# Patient Record
Sex: MALE | Race: WHITE | Employment: PART TIME | URBAN - METROPOLITAN AREA
[De-identification: names, ages, dates, MRNs, and addresses within clinical notes are randomized per-mention and may not be internally consistent; named-entity substitution may affect disease eponyms.]

---
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
## 2021-03-24 ENCOUNTER — IMMUNIZATION (OUTPATIENT)
Dept: PRIMARY CARE CLINIC | Age: 21
End: 2021-03-24
Payer: COMMERCIAL

## 2021-03-24 PROCEDURE — 0011A COVID-19, MODERNA VACCINE 100MCG/0.5ML DOSE: CPT | Performed by: NURSE PRACTITIONER

## 2021-03-24 PROCEDURE — 91301 COVID-19, MODERNA VACCINE 100MCG/0.5ML DOSE: CPT | Performed by: NURSE PRACTITIONER

## 2021-04-21 ENCOUNTER — IMMUNIZATION (OUTPATIENT)
Dept: PRIMARY CARE CLINIC | Age: 21
End: 2021-04-21
Payer: COMMERCIAL

## 2021-04-21 PROCEDURE — 0012A COVID-19, MODERNA VACCINE 100MCG/0.5ML DOSE: CPT | Performed by: NURSE PRACTITIONER

## 2021-04-21 PROCEDURE — 91301 COVID-19, MODERNA VACCINE 100MCG/0.5ML DOSE: CPT | Performed by: NURSE PRACTITIONER

## 2021-07-07 ENCOUNTER — APPOINTMENT (OUTPATIENT)
Dept: GENERAL RADIOLOGY | Age: 21
End: 2021-07-07
Payer: COMMERCIAL

## 2021-07-07 ENCOUNTER — HOSPITAL ENCOUNTER (EMERGENCY)
Age: 21
Discharge: HOME | End: 2021-07-07
Payer: COMMERCIAL

## 2021-07-07 VITALS
HEART RATE: 84 BPM | HEIGHT: 76 IN | SYSTOLIC BLOOD PRESSURE: 122 MMHG | TEMPERATURE: 98.1 F | DIASTOLIC BLOOD PRESSURE: 71 MMHG | WEIGHT: 230 LBS | BODY MASS INDEX: 28.01 KG/M2 | OXYGEN SATURATION: 99 % | RESPIRATION RATE: 16 BRPM

## 2021-07-07 DIAGNOSIS — S43.402A SPRAIN OF LEFT SHOULDER, UNSPECIFIED SHOULDER SPRAIN TYPE, INITIAL ENCOUNTER: Primary | ICD-10-CM

## 2021-07-07 PROCEDURE — 99211 OFF/OP EST MAY X REQ PHY/QHP: CPT

## 2021-07-07 PROCEDURE — 73030 X-RAY EXAM OF SHOULDER: CPT

## 2021-07-07 ASSESSMENT — PAIN DESCRIPTION - LOCATION: LOCATION: SHOULDER

## 2021-07-07 ASSESSMENT — PAIN SCALES - GENERAL: PAINLEVEL_OUTOF10: 4

## 2021-07-07 ASSESSMENT — PAIN DESCRIPTION - PAIN TYPE: TYPE: ACUTE PAIN

## 2021-07-07 ASSESSMENT — PAIN DESCRIPTION - ORIENTATION: ORIENTATION: LEFT

## 2021-07-07 NOTE — ED PROVIDER NOTES
[id number]  Department of Emergency Medicine  UC Encounter Note  21   12:50 PM EDT      NAME: Stuart Almaraz  :  2000  MRN:  [id number]    Chief Complaint: Shoulder Injury (Pt c/o L shoulder popping out of socket last night, also 3 weeks ago, states it goes back in )      This is a 26-year-old male that presents to urgent care complaining of some left shoulder discomfort for the past 3 weeks. He states he was golfing about 3 weeks ago and he felt some pain in the left shoulder. Recently he was been lifting some weights and felt some more pain in the left anterior shoulder. Denies numbness or tingling. No head or neck or other extremity pain. On first contact patient he appears to be in no acute distress. Review of Systems  Pertinent positives and negatives are stated within HPI, all other systems reviewed and are negative. Physical Exam  Vitals and nursing note reviewed. Constitutional:       Appearance: He is well-developed. HENT:      Head: Normocephalic and atraumatic. Jaw: No trismus. Right Ear: Hearing, tympanic membrane, ear canal and external ear normal.      Left Ear: Hearing, tympanic membrane, ear canal and external ear normal.      Nose: Nose normal.      Right Sinus: No maxillary sinus tenderness or frontal sinus tenderness. Left Sinus: No maxillary sinus tenderness or frontal sinus tenderness. Mouth/Throat:      Pharynx: Uvula midline. No uvula swelling. Eyes:      General: Lids are normal.      Conjunctiva/sclera: Conjunctivae normal.      Pupils: Pupils are equal, round, and reactive to light. Cardiovascular:      Rate and Rhythm: Normal rate and regular rhythm. Heart sounds: Normal heart sounds. No murmur heard. Pulmonary:      Effort: Pulmonary effort is normal.      Breath sounds: Normal breath sounds. Abdominal:      General: Bowel sounds are normal.      Palpations: Abdomen is soft.  Abdomen is not rigid. Tenderness: There is no abdominal tenderness. There is no guarding or rebound. Musculoskeletal:      Cervical back: Normal range of motion and neck supple. Comments: Head and neck are atraumatic. Does have some left anterior shoulder soft tissue tenderness. But no deformity. No open area. Muscle strength bilateral is 5-5 reflexes are brisk. Range of motion left shoulder is full mildly painful. No open area no redness no cyanosis. Has palpable radial pulse. Skin:     General: Skin is warm and dry. Findings: No abrasion or rash. Neurological:      Mental Status: He is alert and oriented to person, place, and time. GCS: GCS eye subscore is 4. GCS verbal subscore is 5. GCS motor subscore is 6. Cranial Nerves: Cranial nerves are intact. No cranial nerve deficit. Sensory: Sensation is intact. No sensory deficit. Motor: Motor function is intact. Coordination: Coordination is intact. Coordination normal.      Gait: Gait is intact. Gait normal.         Procedures    MDM  Number of Diagnoses or Management Options  Sprain of left shoulder, unspecified shoulder sprain type, initial encounter  Diagnosis management comments: X-ray reviewed. Instructions given. Treat as shoulder strain. Follow-up with primary care provider. --------------------------------------------- PAST HISTORY ---------------------------------------------  Past Medical History:  has no past medical history on file. Past Surgical History:  has no past surgical history on file. Social History:  reports that he has never smoked. He has never used smokeless tobacco. He reports that he does not drink alcohol and does not use drugs. Family History: family history is not on file. The patients home medications have been reviewed. Allergies: Patient has no known allergies.     -------------------------------------------------- RESULTS -------------------------------------------------  No results found for this visit on 07/07/21. XR SHOULDER LEFT (MIN 2 VIEWS)   Final Result   No acute fractures or dislocations in the left shoulder             ------------------------- NURSING NOTES AND VITALS REVIEWED ---------------------------   The nursing notes within the ED encounter and vital signs as below have been reviewed. /71   Pulse 84   Temp 98.1 °F (36.7 °C) (Infrared)   Resp 16   Ht 6' 4\" (1.93 m)   Wt 230 lb (104.3 kg)   SpO2 99%   BMI 28.00 kg/m²   Oxygen Saturation Interpretation: Normal      ------------------------------------------ PROGRESS NOTES ------------------------------------------   I have spoken with the patient and discussed todays results, in addition to providing specific details for the plan of care and counseling regarding the diagnosis and prognosis. Their questions are answered at this time and they are agreeable with the plan.      --------------------------------- ADDITIONAL PROVIDER NOTES ---------------------------------     This patient is stable for discharge. I have shared the specific conditions for return, as well as the importance of follow-up. * NOTE: This report was transcribed using voice recognition software. Every effort was made to ensure accuracy; however, inadvertent computerized transcription errors may be present.    --------------------------------- IMPRESSION AND DISPOSITION ---------------------------------    IMPRESSION  1.  Sprain of left shoulder, unspecified shoulder sprain type, initial encounter        DISPOSITION  Disposition: Discharge to home  Patient condition is good       Danis Browning PA-C  07/07/21 [id number]

## 2021-07-07 NOTE — LETTER
[facility]  [address] [facility] [address]  Phone: [phone]               July 7, 2021    Patient: Benjamín Sanchez   YOB: 2000   Date of Visit: 7/7/2021       To Whom It May Concern:    Benjamín Sanchez was seen and treated in our emergency department on 7/7/2021. He may return to work on July 8, 2021.       Sincerely,       Prince Mckeon PA-C         Signature:__________________________________

## 2021-11-11 ENCOUNTER — APPOINTMENT (OUTPATIENT)
Dept: CT IMAGING | Age: 21
End: 2021-11-11
Payer: COMMERCIAL

## 2021-11-11 ENCOUNTER — HOSPITAL ENCOUNTER (OUTPATIENT)
Age: 21
Setting detail: OBSERVATION
Discharge: HOME | End: 2021-11-12
Attending: STUDENT IN AN ORGANIZED HEALTH CARE EDUCATION/TRAINING PROGRAM | Admitting: INTERNAL MEDICINE
Payer: COMMERCIAL

## 2021-11-11 DIAGNOSIS — B27.90 ACUTE TONSILLITIS DUE TO INFECTIOUS MONONUCLEOSIS: Primary | ICD-10-CM

## 2021-11-11 DIAGNOSIS — J03.80 ACUTE TONSILLITIS DUE TO INFECTIOUS MONONUCLEOSIS: Primary | ICD-10-CM

## 2021-11-11 LAB
ALBUMIN SERPL-MCNC: 4.4 G/DL (ref 3.5–5.2)
ALP BLD-CCNC: 73 U/L (ref 40–129)
ALT SERPL-CCNC: 41 U/L (ref 0–40)
ANION GAP SERPL CALCULATED.3IONS-SCNC: 14 MMOL/L (ref 7–16)
AST SERPL-CCNC: 35 U/L (ref 0–39)
BASOPHILS ABSOLUTE: 0 E9/L (ref 0–0.2)
BASOPHILS RELATIVE PERCENT: 0 % (ref 0–2)
BILIRUB SERPL-MCNC: 0.6 MG/DL (ref 0–1.2)
BUN BLDV-MCNC: 11 MG/DL (ref 6–20)
CALCIUM SERPL-MCNC: 9.4 MG/DL (ref 8.6–10.2)
CHLORIDE BLD-SCNC: 101 MMOL/L (ref 98–107)
CO2: 23 MMOL/L (ref 22–29)
CREAT SERPL-MCNC: 1.3 MG/DL (ref 0.7–1.2)
EOSINOPHILS ABSOLUTE: 0 E9/L (ref 0.05–0.5)
EOSINOPHILS RELATIVE PERCENT: 0 % (ref 0–6)
GFR AFRICAN AMERICAN: >60
GFR NON-AFRICAN AMERICAN: >60 ML/MIN/1.73
GLUCOSE BLD-MCNC: 149 MG/DL (ref 74–99)
HCT VFR BLD CALC: 42.9 % (ref 37–54)
HEMOGLOBIN: 14.6 G/DL (ref 12.5–16.5)
LACTIC ACID, SEPSIS: 1.2 MMOL/L (ref 0.5–1.9)
LACTIC ACID, SEPSIS: 1.6 MMOL/L (ref 0.5–1.9)
LYMPHOCYTES ABSOLUTE: 6.88 E9/L (ref 1.5–4)
LYMPHOCYTES RELATIVE PERCENT: 43 % (ref 20–42)
MCH RBC QN AUTO: 28.7 PG (ref 26–35)
MCHC RBC AUTO-ENTMCNC: 34 % (ref 32–34.5)
MCV RBC AUTO: 84.3 FL (ref 80–99.9)
MONO TEST: POSITIVE
MONOCYTES ABSOLUTE: 1.12 E9/L (ref 0.1–0.95)
MONOCYTES RELATIVE PERCENT: 7 % (ref 2–12)
NEUTROPHILS ABSOLUTE: 8 E9/L (ref 1.8–7.3)
NEUTROPHILS RELATIVE PERCENT: 50 % (ref 43–80)
PDW BLD-RTO: 12.7 FL (ref 11.5–15)
PLATELET # BLD: 253 E9/L (ref 130–450)
PMV BLD AUTO: 10 FL (ref 7–12)
POTASSIUM REFLEX MAGNESIUM: 3.6 MMOL/L (ref 3.5–5)
RBC # BLD: 5.09 E12/L (ref 3.8–5.8)
SODIUM BLD-SCNC: 138 MMOL/L (ref 132–146)
TOTAL PROTEIN: 8.3 G/DL (ref 6.4–8.3)
WBC # BLD: 16 E9/L (ref 4.5–11.5)

## 2021-11-11 PROCEDURE — 96361 HYDRATE IV INFUSION ADD-ON: CPT

## 2021-11-11 PROCEDURE — 86308 HETEROPHILE ANTIBODY SCREEN: CPT

## 2021-11-11 PROCEDURE — 2580000003 HC RX 258: Performed by: STUDENT IN AN ORGANIZED HEALTH CARE EDUCATION/TRAINING PROGRAM

## 2021-11-11 PROCEDURE — G0378 HOSPITAL OBSERVATION PER HR: HCPCS

## 2021-11-11 PROCEDURE — 6360000002 HC RX W HCPCS: Performed by: INTERNAL MEDICINE

## 2021-11-11 PROCEDURE — 80053 COMPREHEN METABOLIC PANEL: CPT

## 2021-11-11 PROCEDURE — 6360000002 HC RX W HCPCS: Performed by: STUDENT IN AN ORGANIZED HEALTH CARE EDUCATION/TRAINING PROGRAM

## 2021-11-11 PROCEDURE — 6360000004 HC RX CONTRAST MEDICATION: Performed by: RADIOLOGY

## 2021-11-11 PROCEDURE — 70491 CT SOFT TISSUE NECK W/DYE: CPT

## 2021-11-11 PROCEDURE — 96375 TX/PRO/DX INJ NEW DRUG ADDON: CPT

## 2021-11-11 PROCEDURE — 96376 TX/PRO/DX INJ SAME DRUG ADON: CPT

## 2021-11-11 PROCEDURE — 83605 ASSAY OF LACTIC ACID: CPT

## 2021-11-11 PROCEDURE — 87040 BLOOD CULTURE FOR BACTERIA: CPT

## 2021-11-11 PROCEDURE — 85025 COMPLETE CBC W/AUTO DIFF WBC: CPT

## 2021-11-11 PROCEDURE — 99284 EMERGENCY DEPT VISIT MOD MDM: CPT

## 2021-11-11 PROCEDURE — 96374 THER/PROPH/DIAG INJ IV PUSH: CPT

## 2021-11-11 RX ORDER — METHYLPREDNISOLONE 4 MG/1
4 TABLET ORAL SEE ADMIN INSTRUCTIONS
Status: ON HOLD | COMMUNITY
Start: 2021-11-09 | End: 2021-11-12 | Stop reason: HOSPADM

## 2021-11-11 RX ORDER — DEXAMETHASONE SODIUM PHOSPHATE 10 MG/ML
10 INJECTION INTRAMUSCULAR; INTRAVENOUS ONCE
Status: COMPLETED | OUTPATIENT
Start: 2021-11-11 | End: 2021-11-11

## 2021-11-11 RX ORDER — KETOROLAC TROMETHAMINE 15 MG/ML
15 INJECTION, SOLUTION INTRAMUSCULAR; INTRAVENOUS ONCE
Status: COMPLETED | OUTPATIENT
Start: 2021-11-11 | End: 2021-11-11

## 2021-11-11 RX ORDER — ONDANSETRON 2 MG/ML
4 INJECTION INTRAMUSCULAR; INTRAVENOUS EVERY 6 HOURS PRN
Status: DISCONTINUED | OUTPATIENT
Start: 2021-11-11 | End: 2021-11-12 | Stop reason: HOSPADM

## 2021-11-11 RX ORDER — CEFDINIR 300 MG/1
300 CAPSULE ORAL 2 TIMES DAILY
COMMUNITY
Start: 2021-11-09 | End: 2022-07-18 | Stop reason: ALTCHOICE

## 2021-11-11 RX ORDER — FLUTICASONE PROPIONATE 50 MCG
2 SPRAY, SUSPENSION (ML) NASAL DAILY
COMMUNITY

## 2021-11-11 RX ORDER — KETOROLAC TROMETHAMINE 30 MG/ML
30 INJECTION, SOLUTION INTRAMUSCULAR; INTRAVENOUS EVERY 6 HOURS PRN
Status: DISCONTINUED | OUTPATIENT
Start: 2021-11-11 | End: 2021-11-12 | Stop reason: HOSPADM

## 2021-11-11 RX ORDER — 0.9 % SODIUM CHLORIDE 0.9 %
1000 INTRAVENOUS SOLUTION INTRAVENOUS ONCE
Status: COMPLETED | OUTPATIENT
Start: 2021-11-11 | End: 2021-11-11

## 2021-11-11 RX ORDER — DEXAMETHASONE SODIUM PHOSPHATE 10 MG/ML
10 INJECTION INTRAMUSCULAR; INTRAVENOUS EVERY 8 HOURS
Status: DISCONTINUED | OUTPATIENT
Start: 2021-11-11 | End: 2021-11-12 | Stop reason: HOSPADM

## 2021-11-11 RX ORDER — SODIUM CHLORIDE AND POTASSIUM CHLORIDE .9; .15 G/100ML; G/100ML
SOLUTION INTRAVENOUS CONTINUOUS
Status: DISCONTINUED | OUTPATIENT
Start: 2021-11-11 | End: 2021-11-12 | Stop reason: HOSPADM

## 2021-11-11 RX ORDER — FLUTICASONE PROPIONATE 50 MCG
2 SPRAY, SUSPENSION (ML) NASAL DAILY
Status: DISCONTINUED | OUTPATIENT
Start: 2021-11-11 | End: 2021-11-12 | Stop reason: HOSPADM

## 2021-11-11 RX ORDER — IBUPROFEN, ACETAMINOPHEN 125; 250 MG/1; MG/1
2 TABLET, FILM COATED ORAL 3 TIMES DAILY PRN
COMMUNITY
End: 2022-07-18 | Stop reason: ALTCHOICE

## 2021-11-11 RX ORDER — ACETAMINOPHEN 500 MG
500 TABLET ORAL EVERY 6 HOURS PRN
Status: DISCONTINUED | OUTPATIENT
Start: 2021-11-11 | End: 2021-11-12 | Stop reason: HOSPADM

## 2021-11-11 RX ADMIN — POTASSIUM CHLORIDE AND SODIUM CHLORIDE: 900; 150 INJECTION, SOLUTION INTRAVENOUS at 17:43

## 2021-11-11 RX ADMIN — SODIUM CHLORIDE 1000 ML: 9 INJECTION, SOLUTION INTRAVENOUS at 10:16

## 2021-11-11 RX ADMIN — KETOROLAC TROMETHAMINE 15 MG: 15 INJECTION, SOLUTION INTRAMUSCULAR; INTRAVENOUS at 10:14

## 2021-11-11 RX ADMIN — SODIUM CHLORIDE 1000 ML: 9 INJECTION, SOLUTION INTRAVENOUS at 14:32

## 2021-11-11 RX ADMIN — IOPAMIDOL 75 ML: 755 INJECTION, SOLUTION INTRAVENOUS at 12:28

## 2021-11-11 RX ADMIN — SODIUM CHLORIDE 1000 ML: 9 INJECTION, SOLUTION INTRAVENOUS at 10:54

## 2021-11-11 RX ADMIN — DEXAMETHASONE SODIUM PHOSPHATE 10 MG: 10 INJECTION INTRAMUSCULAR; INTRAVENOUS at 17:42

## 2021-11-11 RX ADMIN — SODIUM CHLORIDE 1000 ML: 9 INJECTION, SOLUTION INTRAVENOUS at 13:38

## 2021-11-11 RX ADMIN — DEXAMETHASONE SODIUM PHOSPHATE 10 MG: 10 INJECTION INTRAMUSCULAR; INTRAVENOUS at 10:14

## 2021-11-11 ASSESSMENT — ENCOUNTER SYMPTOMS
DIARRHEA: 0
BACK PAIN: 0
COUGH: 0
RHINORRHEA: 0
VOICE CHANGE: 1
BLOOD IN STOOL: 0
ABDOMINAL PAIN: 0
CONSTIPATION: 0
NAUSEA: 1
SORE THROAT: 1
SHORTNESS OF BREATH: 0
VOMITING: 0
TROUBLE SWALLOWING: 1

## 2021-11-11 ASSESSMENT — PAIN SCALES - GENERAL
PAINLEVEL_OUTOF10: 0
PAINLEVEL_OUTOF10: 6

## 2021-11-11 NOTE — ED PROVIDER NOTES
Jamin Chauhan is a 24 y.o. male without a significant PMHx who presents for evaluation of sore throat and swelling, beginning prior to arrival.  The complaint has been persistent, severe in severity, and worsened by nothing. The patient states that about four days ago he started to develop a sore throat. He was seen by his PCP, Dr. Shakira Smalls and was started on omnicef and a medrol dose pack. Notes that over the last day he has had worsening swelling, change in his voice and difficulty swallowing. The patient does also note fatigue, bodyaches, fevers. States that he has not had much to eat over the last few days. The history is provided by the patient and medical records. Review of Systems   Constitutional: Positive for fatigue and fever. Negative for chills. HENT: Positive for sore throat, trouble swallowing and voice change. Negative for postnasal drip and rhinorrhea. Eyes: Negative for visual disturbance. Respiratory: Negative for cough and shortness of breath. Cardiovascular: Negative for chest pain. Gastrointestinal: Positive for nausea. Negative for abdominal pain, blood in stool, constipation, diarrhea and vomiting. Genitourinary: Negative for difficulty urinating, dysuria and urgency. Musculoskeletal: Negative for back pain. Skin: Negative for rash. Neurological: Negative for dizziness, numbness and headaches. Physical Exam  Vitals and nursing note reviewed. Constitutional:       General: He is not in acute distress. Appearance: He is well-developed. He is not ill-appearing. HENT:      Head: Normocephalic and atraumatic. Jaw: No trismus, swelling or pain on movement. Mouth/Throat:      Mouth: Mucous membranes are moist. No oral lesions or angioedema. Dentition: Normal dentition. Does not have dentures. No gingival swelling. Pharynx: Oropharyngeal exudate and posterior oropharyngeal erythema present. Tonsils: Tonsillar exudate present. No tonsillar abscesses. 4+ on the right. 4+ on the left. Eyes:      Pupils: Pupils are equal, round, and reactive to light. Cardiovascular:      Rate and Rhythm: Regular rhythm. Tachycardia present. Heart sounds: Normal heart sounds. No murmur heard. Pulmonary:      Effort: Pulmonary effort is normal. No respiratory distress. Breath sounds: Normal breath sounds. No wheezing or rales. Abdominal:      General: There is no distension. Palpations: Abdomen is soft. There is no mass. Tenderness: There is no abdominal tenderness. Hernia: No hernia is present. Musculoskeletal:      Cervical back: Normal range of motion and neck supple. Skin:     General: Skin is warm and dry. Coloration: Skin is not jaundiced or pale. Neurological:      General: No focal deficit present. Mental Status: He is alert and oriented to person, place, and time. Cranial Nerves: No cranial nerve deficit. Coordination: Coordination normal.          Procedures     [facility]     ED Course as of 11/11/21 1839   u Nov 11, 2021   1050 Patient reevaluatedPatient reevaluated, lying but  We will closely monitor. no acuteS. Notes some improvement at this point in time. [BB]   1247 Patient reevaluate, lying bed no acute distress. Discussed today's results and finding of mono. CT still pending. Continues to be tachycardic, creatinine 1.3, will order additional fluids. [BB]   1521 Spoke with Dr. Raul Bright, discussed the patient. He will admit the patient. [BB]      ED Course User Index  [BB] DO Bassem Henry presents to the ED for evaluation of sorethroat and tonsil swelling. Workup in the ED revealed patient with severely swollen tonsils and little visible airway on exam, he also has a change in voice. He was given steroids, labs were drawn showing leukocytosis (likely secondary to the patient's current medrol dose pack).  He was tachycardic with a slight elevation in creatinine, likely secondary to dehydration. He was given 3L of IVF with improvement of heart rate. Imaging was obtained which was negative for fluid collection or evidence of abscess. The case was discussed with ENT who notes there is no surgical intervention, however would agree the patient should be admitted to medicine for observation due to concern for possible airway compromise. Patient requires continued workup and management of their symptoms and will be admitted to the hospital for further evaluation and treatment.      --------------------------------------------- PAST HISTORY ---------------------------------------------  Past Medical History:  has no past medical history on file. Past Surgical History:  has no past surgical history on file. Social History:  reports that he has never smoked. He has never used smokeless tobacco. He reports that he does not drink alcohol and does not use drugs. Family History: family history is not on file. The patients home medications have been reviewed. Allergies: Patient has no known allergies.     -------------------------------------------------- RESULTS -------------------------------------------------    LABS:  Results for orders placed or performed during the hospital encounter of 11/11/21   Culture, Blood 1    Specimen: Blood   Result Value Ref Range    Blood Culture, Routine 24 Hours no growth    Culture, Blood 2    Specimen: Blood   Result Value Ref Range    Culture, Blood 2 24 Hours no growth    CBC Auto Differential   Result Value Ref Range    WBC 16.0 (H) 4.5 - 11.5 E9/L    RBC 5.09 3.80 - 5.80 E12/L    Hemoglobin 14.6 12.5 - 16.5 g/dL    Hematocrit 42.9 37.0 - 54.0 %    MCV 84.3 80.0 - 99.9 fL    MCH 28.7 26.0 - 35.0 pg    MCHC 34.0 32.0 - 34.5 %    RDW 12.7 11.5 - 15.0 fL    Platelets 721 541 - 648 E9/L    MPV 10.0 7.0 - 12.0 fL    Neutrophils % 50.0 43.0 - 80.0 %    Lymphocytes % 43.0 (H) 20.0 - 42.0 %    Monocytes % 7.0 2.0 - 12.0 % Eosinophils % 0.0 0.0 - 6.0 %    Basophils % 0.0 0.0 - 2.0 %    Neutrophils Absolute 8.00 (H) 1.80 - 7.30 E9/L    Lymphocytes Absolute 6.88 (H) 1.50 - 4.00 E9/L    Monocytes Absolute 1.12 (H) 0.10 - 0.95 E9/L    Eosinophils Absolute 0.00 (L) 0.05 - 0.50 E9/L    Basophils Absolute 0.00 0.00 - 0.20 E9/L   Comprehensive Metabolic Panel w/ Reflex to MG   Result Value Ref Range    Sodium 138 132 - 146 mmol/L    Potassium reflex Magnesium 3.6 3.5 - 5.0 mmol/L    Chloride 101 98 - 107 mmol/L    CO2 23 22 - 29 mmol/L    Anion Gap 14 7 - 16 mmol/L    Glucose 149 (H) 74 - 99 mg/dL    BUN 11 6 - 20 mg/dL    CREATININE 1.3 (H) 0.7 - 1.2 mg/dL    GFR Non-African American >60 >=60 mL/min/1.73    GFR African American >60     Calcium 9.4 8.6 - 10.2 mg/dL    Total Protein 8.3 6.4 - 8.3 g/dL    Albumin 4.4 3.5 - 5.2 g/dL    Total Bilirubin 0.6 0.0 - 1.2 mg/dL    Alkaline Phosphatase 73 40 - 129 U/L    ALT 41 (H) 0 - 40 U/L    AST 35 0 - 39 U/L   Lactate, Sepsis   Result Value Ref Range    Lactic Acid, Sepsis 1.6 0.5 - 1.9 mmol/L   Lactate, Sepsis   Result Value Ref Range    Lactic Acid, Sepsis 1.2 0.5 - 1.9 mmol/L   Mononucleosis Screen   Result Value Ref Range    Mono Test POSITIVE (A) Negative   Comprehensive Metabolic Panel   Result Value Ref Range    Sodium 138 132 - 146 mmol/L    Potassium 4.0 3.5 - 5.0 mmol/L    Chloride 106 98 - 107 mmol/L    CO2 21 (L) 22 - 29 mmol/L    Anion Gap 11 7 - 16 mmol/L    Glucose 139 (H) 74 - 99 mg/dL    BUN 9 6 - 20 mg/dL    CREATININE 0.9 0.7 - 1.2 mg/dL    GFR Non-African American >60 >=60 mL/min/1.73    GFR African American >60     Calcium 8.9 8.6 - 10.2 mg/dL    Total Protein 7.4 6.4 - 8.3 g/dL    Albumin 3.8 3.5 - 5.2 g/dL    Total Bilirubin 0.4 0.0 - 1.2 mg/dL    Alkaline Phosphatase 68 40 - 129 U/L    ALT 35 0 - 40 U/L    AST 27 0 - 39 U/L   CBC Auto Differential   Result Value Ref Range    WBC 15.0 (H) 4.5 - 11.5 E9/L    RBC 4.62 3.80 - 5.80 E12/L    Hemoglobin 13.1 12.5 - 16.5 g/dL Hematocrit 38.6 37.0 - 54.0 %    MCV 83.5 80.0 - 99.9 fL    MCH 28.4 26.0 - 35.0 pg    MCHC 33.9 32.0 - 34.5 %    RDW 13.0 11.5 - 15.0 fL    Platelets 139 698 - 887 E9/L    MPV 9.5 7.0 - 12.0 fL    Neutrophils % 75.4 43.0 - 80.0 %    Lymphocytes % 16.7 (L) 20.0 - 42.0 %    Monocytes % 1.8 (L) 2.0 - 12.0 %    Eosinophils % 0.0 0.0 - 6.0 %    Basophils % 0.6 0.0 - 2.0 %    Neutrophils Absolute 11.40 (H) 1.80 - 7.30 E9/L    Lymphocytes Absolute 3.30 1.50 - 4.00 E9/L    Monocytes Absolute 0.30 0.10 - 0.95 E9/L    Eosinophils Absolute 0.00 (L) 0.05 - 0.50 E9/L    Basophils Absolute 0.00 0.00 - 0.20 E9/L    Atypical Lymphocytes Relative 5.3 (H) 0.0 - 4.0 %    Metamyelocytes Relative 0.9 0.0 - 1.0 %    Anisocytosis 1+     Polychromasia 3+     Poikilocytes 2+     Cowgill Cells 2+     Ovalocytes 1+    TSH without Reflex   Result Value Ref Range    TSH 0.342 0.270 - 4.200 uIU/mL   Lipid Panel   Result Value Ref Range    Cholesterol, Total 111 0 - 199 mg/dL    Triglycerides 77 0 - 149 mg/dL    HDL 34 >40 mg/dL    LDL Calculated 62 0 - 99 mg/dL    VLDL Cholesterol Calculated 15 mg/dL   Magnesium   Result Value Ref Range    Magnesium 2.1 1.6 - 2.6 mg/dL   C-Reactive Protein   Result Value Ref Range    CRP 4.5 (H) 0.0 - 0.4 mg/dL       RADIOLOGY:  CT SOFT TISSUE NECK W CONTRAST   Final Result   Edematous bilateral tonsils may relate to a degree of tonsillitis. There is   no eva fluid collection or abscess. Demonstration of material in the nasal passages bilaterally. Bilateral cervical lymph nodes that are likely reactive although continued   clinical follow-up is recommended. ------------------------- NURSING NOTES AND VITALS REVIEWED ---------------------------  Date / Time Roomed:  11/11/2021  9:40 AM  ED Bed Assignment:  [id number]    The nursing notes within the ED encounter and vital signs as below have been reviewed.      Patient Vitals for the past 24 hrs:   BP Temp Temp src Pulse Resp SpO2   11/12/21 0546 131/84 98.5 °F (36.9 °C) Oral 97 18 98 %   11/11/21 1652 (!) 150/80 98.2 °F (36.8 °C) Oral 105 18 95 %   11/11/21 1431 (!) 144/77 98.3 °F (36.8 °C) Oral 104 16 99 %       Oxygen Saturation Interpretation: Normal    ------------------------------------------ PROGRESS NOTES ------------------------------------------  Counseling:  I have spoken with the patient and discussed todays results, in addition to providing specific details for the plan of care and counseling regarding the diagnosis and prognosis. Their questions are answered at this time and they are agreeable with the plan of admission.    --------------------------------- ADDITIONAL PROVIDER NOTES ---------------------------------  Consultations:  Time: 1521. Spoke with Dr. Syed Schaffer. Discussed case. They will admit the patient. This patient's ED course included: a personal history and physicial examination, re-evaluation prior to disposition, multiple bedside re-evaluations, IV medications, cardiac monitoring, continuous pulse oximetry and complex medical decision making and emergency management    This patient has remained hemodynamically stable during their ED course. Diagnosis:  1. Acute tonsillitis due to infectious mononucleosis        Disposition:  Patient's disposition: Admit to med/surg floor  Patient's condition is stable.            Marta Rodriguez DO  11/12/21 [id number]

## 2021-11-11 NOTE — PROGRESS NOTES
Spoke with ED staff in regards to the case. Patient tested positive for mono. Imaging was reviewed and there is no evidence of peritonsillar abscess or fluid collection on CT neck and thus no need for surgical intervention at this time. Agree with plan to admit for medical and supportive management in the form of IV hydration and steroids. Nothing further for ENT to add to the case at this time.      Electronically signed by Desean Seals DO on 11/11/2021 at 4:48 PM

## 2021-11-11 NOTE — H&P
Department of Internal Medicine        CHIEF COMPLAINT: Shortness of breath, sore throat, dysphagia    Reason for Admission: Acute viral pharyngitis, positive infectious mononucleosis    HISTORY OF PRESENT ILLNESS:      The patient is a 24 y.o. male who presents with shortness of breath, fever/chills, sore throat and trouble swallowing that initially started 4 days ago. Patient's symptoms with swallowing and shortness of breath got worse over the last 48 hours. Patient states he is actually had symptoms similar to this since January. Patient's mother is at the bedside and case discussed. Patient is feeling little bit better at this time is able to drink liquids fairly easily. BUN/creatinine 11/1.3 on admission with WBC 16,000 with 43% lymphocytes and hemoglobin 14.6. Patient had positive mono test.. Temperature is 98.3 with heart rate 104 blood pressure 144/77 O2 sat 99% room air at rest.  ENT was notified in the ED and they reviewed the chart and did not think the patient needed any surgical intervention at this time time and they will evaluate the patient if the patient has any further problems. Past Medical History:    No past medical history on file. Past Surgical History:    No past surgical history on file. Medications Prior to Admission:    @  Prior to Admission medications    Medication Sig Start Date End Date Taking? Authorizing Provider   fluticasone (FLONASE) 50 MCG/ACT nasal spray 2 sprays by Each Nostril route daily   Yes Historical Provider, MD   cefdinir (OMNICEF) 300 MG capsule Take 300 mg by mouth 2 times daily 11/9/21  Yes Historical Provider, MD   methylPREDNISolone (MEDROL DOSEPACK) 4 MG tablet Take 4 mg by mouth See Admin Instructions Take by mouth.  11/9/21  Yes Historical Provider, MD   pseudoephedrine (SUDAFED SINUS CONGESTION 24HR) 240 MG TB24 extended release tablet Take 240 mg by mouth nightly as needed (Congestion)   Yes Historical Provider, MD   Ibuprofen-Acetaminophen (ADVIL DUAL ACTION) 125-250 MG TABS Take 2 tablets by mouth 3 times daily as needed (Pain/Fever)   Yes Historical Provider, MD       Allergies:  Patient has no known allergies. Social History:   Social History     Socioeconomic History    Marital status: Single     Spouse name: Not on file    Number of children: Not on file    Years of education: Not on file    Highest education level: Not on file   Occupational History    Not on file   Tobacco Use    Smoking status: Never Smoker    Smokeless tobacco: Never Used   Vaping Use    Vaping Use: Never used   Substance and Sexual Activity    Alcohol use: Never    Drug use: Never    Sexual activity: Never   Other Topics Concern    Not on file   Social History Narrative    Not on file     Social Determinants of Health     Financial Resource Strain:     Difficulty of Paying Living Expenses: Not on file   Food Insecurity:     Worried About Running Out of Food in the Last Year: Not on file    Christina of Food in the Last Year: Not on file   Transportation Needs:     Lack of Transportation (Medical): Not on file    Lack of Transportation (Non-Medical):  Not on file   Physical Activity:     Days of Exercise per Week: Not on file    Minutes of Exercise per Session: Not on file   Stress:     Feeling of Stress : Not on file   Social Connections:     Frequency of Communication with Friends and Family: Not on file    Frequency of Social Gatherings with Friends and Family: Not on file    Attends Jew Services: Not on file    Active Member of Clubs or Organizations: Not on file    Attends Club or Organization Meetings: Not on file    Marital Status: Not on file   Intimate Partner Violence:     Fear of Current or Ex-Partner: Not on file    Emotionally Abused: Not on file    Physically Abused: Not on file    Sexually Abused: Not on file   Housing Stability:     Unable to Pay for Housing in the Last Year: Not on file    Number of [place] in the Last Year: Not on file    Unstable Housing in the Last Year: Not on file       Family History:   No family history on file. REVIEW OF SYSTEMS:    Gen: Patient denies any lightheadedness or dizziness. No LOC or syncope. + fevers or chills. HEENT: No earache, +sore throat, nasal congestion. Resp: Denies cough, hemoptysis or sputum production. Cardiac: Denies chest pain, +SOB, no diaphoresis or palpitations. GI: No nausea, vomiting, diarrhea or constipation. No melena or hematochezia. : No urinary complaints, dysuria, hematuria or frequency. MSK: No extremity weakness, paralysis or paresthesias. PHYSICAL EXAM:    Vitals:  BP (!) 150/80   Pulse 105   Temp 98.2 °F (36.8 °C) (Oral)   Resp 18   Ht 6' 4\" (1.93 m)   Wt 226 lb (102.5 kg)   SpO2 95%   BMI 27.51 kg/m²     General:  This is a 24 y.o. yo male who is alert and oriented in NAD  HEENT:  Head is normocephalic and atraumatic, PERRLA, EOMI, mucus membranes moist with + pharyngeal erythema with marked tonsillar enlargement. Neck:  Supple with no carotid bruits, JVD or thyromegaly.   + cervical adenopathy  CV:  Regular rate and rhythm, no murmurs  Lungs:  Clear to auscultation bilaterally with no wheezes, rales or rhonchi  Abdomen:  Soft, nontender, nondistended, bowel sounds present  Extremities:  No edema, peripheral pulses intact bilaterally  Neuro:  Cranial nerves II-XII grossly intact; motor and sensory function intact with no focal deficits  Skin:  No rashes, lesions or wounds    DATA:  CBC with Differential:    Lab Results   Component Value Date    WBC 16.0 11/11/2021    RBC 5.09 11/11/2021    HGB 14.6 11/11/2021    HCT 42.9 11/11/2021     11/11/2021    MCV 84.3 11/11/2021    MCH 28.7 11/11/2021    MCHC 34.0 11/11/2021    RDW 12.7 11/11/2021    LYMPHOPCT 43.0 11/11/2021    MONOPCT 7.0 11/11/2021    BASOPCT 0.0 11/11/2021    MONOSABS 1.12 11/11/2021    LYMPHSABS 6.88 11/11/2021    EOSABS 0.00 11/11/2021    BASOSABS 0.00 11/11/2021     CMP:    Lab Results   Component Value Date     11/11/2021    K 3.6 11/11/2021     11/11/2021    CO2 23 11/11/2021    BUN 11 11/11/2021    CREATININE 1.3 11/11/2021    GFRAA >60 11/11/2021    LABGLOM >60 11/11/2021    GLUCOSE 149 11/11/2021    PROT 8.3 11/11/2021    LABALBU 4.4 11/11/2021    CALCIUM 9.4 11/11/2021    BILITOT 0.6 11/11/2021    ALKPHOS 73 11/11/2021    AST 35 11/11/2021    ALT 41 11/11/2021     Magnesium:  No results found for: MG  Phosphorus:  No results found for: PHOS  PT/INR:  No results found for: PROTIME, INR  Troponin:  No results found for: TROPONINI  U/A:  No results found for: NITRITE, COLORU, PROTEINU, PHUR, LABCAST, WBCUA, RBCUA, MUCUS, TRICHOMONAS, YEAST, BACTERIA, CLARITYU, SPECGRAV, LEUKOCYTESUR, UROBILINOGEN, BILIRUBINUR, BLOODU, GLUCOSEU, AMORPHOUS  ABG:  No results found for: PH, PCO2, PO2, HCO3, BE, THGB, TCO2, O2SAT  HgBA1c:  No results found for: LABA1C  FLP:  No results found for: TRIG, HDL, LDLCALC, LDLDIRECT, LABVLDL  TSH:  No results found for: TSH  IRON:  No results found for: IRON  LIPASE:  No results found for: LIPASE    ASSESSMENT AND PLAN:      Patient Active Problem List    Diagnosis Date Noted    Acute tonsillitis due to infectious mononucleosis  Acute kidney injury 11/11/2021     Plan:  Observation to general medical floor  IV fluids normal saline 20 KCl at 100 cc an hour  Diet as tolerated  Decadron 10 mg IV push every 8 hours  Activity up ad lety. O2 nasal cannula for O2 sat less than 92%  Tylenol grains 10 every six as needed  Zofran 4 mg IV push every six as needed  CMP, CBC, C-reactive protein, TSH in a.m.     Chuyita Alejandra DO, D.O.  11/11/2021  5:06 PM

## 2021-11-12 VITALS
TEMPERATURE: 97.3 F | BODY MASS INDEX: 27.52 KG/M2 | WEIGHT: 226 LBS | SYSTOLIC BLOOD PRESSURE: 134 MMHG | HEIGHT: 76 IN | HEART RATE: 97 BPM | DIASTOLIC BLOOD PRESSURE: 66 MMHG | OXYGEN SATURATION: 99 % | RESPIRATION RATE: 18 BRPM

## 2021-11-12 LAB
ALBUMIN SERPL-MCNC: 3.8 G/DL (ref 3.5–5.2)
ALP BLD-CCNC: 68 U/L (ref 40–129)
ALT SERPL-CCNC: 35 U/L (ref 0–40)
ANION GAP SERPL CALCULATED.3IONS-SCNC: 11 MMOL/L (ref 7–16)
ANISOCYTOSIS: ABNORMAL
AST SERPL-CCNC: 27 U/L (ref 0–39)
ATYPICAL LYMPHOCYTE RELATIVE PERCENT: 5.3 % (ref 0–4)
BASOPHILS ABSOLUTE: 0 E9/L (ref 0–0.2)
BASOPHILS RELATIVE PERCENT: 0.6 % (ref 0–2)
BILIRUB SERPL-MCNC: 0.4 MG/DL (ref 0–1.2)
BUN BLDV-MCNC: 9 MG/DL (ref 6–20)
BURR CELLS: ABNORMAL
C-REACTIVE PROTEIN: 4.5 MG/DL (ref 0–0.4)
CALCIUM SERPL-MCNC: 8.9 MG/DL (ref 8.6–10.2)
CHLORIDE BLD-SCNC: 106 MMOL/L (ref 98–107)
CHOLESTEROL, TOTAL: 111 MG/DL (ref 0–199)
CO2: 21 MMOL/L (ref 22–29)
CREAT SERPL-MCNC: 0.9 MG/DL (ref 0.7–1.2)
EOSINOPHILS ABSOLUTE: 0 E9/L (ref 0.05–0.5)
EOSINOPHILS RELATIVE PERCENT: 0 % (ref 0–6)
GFR AFRICAN AMERICAN: >60
GFR NON-AFRICAN AMERICAN: >60 ML/MIN/1.73
GLUCOSE BLD-MCNC: 139 MG/DL (ref 74–99)
HCT VFR BLD CALC: 38.6 % (ref 37–54)
HDLC SERPL-MCNC: 34 MG/DL
HEMOGLOBIN: 13.1 G/DL (ref 12.5–16.5)
LDL CHOLESTEROL CALCULATED: 62 MG/DL (ref 0–99)
LYMPHOCYTES ABSOLUTE: 3.3 E9/L (ref 1.5–4)
LYMPHOCYTES RELATIVE PERCENT: 16.7 % (ref 20–42)
MAGNESIUM: 2.1 MG/DL (ref 1.6–2.6)
MCH RBC QN AUTO: 28.4 PG (ref 26–35)
MCHC RBC AUTO-ENTMCNC: 33.9 % (ref 32–34.5)
MCV RBC AUTO: 83.5 FL (ref 80–99.9)
METAMYELOCYTES RELATIVE PERCENT: 0.9 % (ref 0–1)
MONOCYTES ABSOLUTE: 0.3 E9/L (ref 0.1–0.95)
MONOCYTES RELATIVE PERCENT: 1.8 % (ref 2–12)
NEUTROPHILS ABSOLUTE: 11.4 E9/L (ref 1.8–7.3)
NEUTROPHILS RELATIVE PERCENT: 75.4 % (ref 43–80)
OVALOCYTES: ABNORMAL
PDW BLD-RTO: 13 FL (ref 11.5–15)
PLATELET # BLD: 258 E9/L (ref 130–450)
PMV BLD AUTO: 9.5 FL (ref 7–12)
POIKILOCYTES: ABNORMAL
POLYCHROMASIA: ABNORMAL
POTASSIUM SERPL-SCNC: 4 MMOL/L (ref 3.5–5)
RBC # BLD: 4.62 E12/L (ref 3.8–5.8)
SODIUM BLD-SCNC: 138 MMOL/L (ref 132–146)
TOTAL PROTEIN: 7.4 G/DL (ref 6.4–8.3)
TRIGL SERPL-MCNC: 77 MG/DL (ref 0–149)
TSH SERPL DL<=0.05 MIU/L-ACNC: 0.34 UIU/ML (ref 0.27–4.2)
VLDLC SERPL CALC-MCNC: 15 MG/DL
WBC # BLD: 15 E9/L (ref 4.5–11.5)

## 2021-11-12 PROCEDURE — 6370000000 HC RX 637 (ALT 250 FOR IP): Performed by: INTERNAL MEDICINE

## 2021-11-12 PROCEDURE — 36415 COLL VENOUS BLD VENIPUNCTURE: CPT

## 2021-11-12 PROCEDURE — 6360000002 HC RX W HCPCS: Performed by: STUDENT IN AN ORGANIZED HEALTH CARE EDUCATION/TRAINING PROGRAM

## 2021-11-12 PROCEDURE — 6360000002 HC RX W HCPCS: Performed by: INTERNAL MEDICINE

## 2021-11-12 PROCEDURE — 80061 LIPID PANEL: CPT

## 2021-11-12 PROCEDURE — 84443 ASSAY THYROID STIM HORMONE: CPT

## 2021-11-12 PROCEDURE — 96376 TX/PRO/DX INJ SAME DRUG ADON: CPT

## 2021-11-12 PROCEDURE — G0378 HOSPITAL OBSERVATION PER HR: HCPCS

## 2021-11-12 PROCEDURE — 85025 COMPLETE CBC W/AUTO DIFF WBC: CPT

## 2021-11-12 PROCEDURE — 86140 C-REACTIVE PROTEIN: CPT

## 2021-11-12 PROCEDURE — 83735 ASSAY OF MAGNESIUM: CPT

## 2021-11-12 PROCEDURE — 80053 COMPREHEN METABOLIC PANEL: CPT

## 2021-11-12 RX ORDER — DEXAMETHASONE 6 MG/1
6 TABLET ORAL 3 TIMES DAILY
Qty: 15 TABLET | Refills: 0 | Status: SHIPPED | OUTPATIENT
Start: 2021-11-12 | End: 2021-11-17

## 2021-11-12 RX ORDER — DEXAMETHASONE 6 MG/1
6 TABLET ORAL 3 TIMES DAILY
Qty: 15 TABLET | Refills: 0 | COMMUNITY
Start: 2021-11-12 | End: 2021-11-12 | Stop reason: SDUPTHER

## 2021-11-12 RX ADMIN — FLUTICASONE PROPIONATE 2 SPRAY: 50 SPRAY, METERED NASAL at 09:12

## 2021-11-12 RX ADMIN — DEXAMETHASONE SODIUM PHOSPHATE 10 MG: 10 INJECTION INTRAMUSCULAR; INTRAVENOUS at 10:27

## 2021-11-12 RX ADMIN — POTASSIUM CHLORIDE AND SODIUM CHLORIDE: 900; 150 INJECTION, SOLUTION INTRAVENOUS at 02:24

## 2021-11-12 RX ADMIN — DEXAMETHASONE SODIUM PHOSPHATE 10 MG: 10 INJECTION INTRAMUSCULAR; INTRAVENOUS at 02:24

## 2021-11-12 RX ADMIN — POTASSIUM CHLORIDE AND SODIUM CHLORIDE: 900; 150 INJECTION, SOLUTION INTRAVENOUS at 12:26

## 2021-11-12 RX ADMIN — DEXAMETHASONE SODIUM PHOSPHATE 10 MG: 10 INJECTION INTRAMUSCULAR; INTRAVENOUS at 16:37

## 2021-11-12 RX ADMIN — KETOROLAC TROMETHAMINE 30 MG: 30 INJECTION, SOLUTION INTRAMUSCULAR; INTRAVENOUS at 06:20

## 2021-11-12 ASSESSMENT — PAIN SCALES - GENERAL
PAINLEVEL_OUTOF10: 0
PAINLEVEL_OUTOF10: 6

## 2021-11-12 NOTE — PROGRESS NOTES
I was unable to sign the telephone discharge order from Dr. Marilu Wilson because the admission order was never signed.

## 2021-11-12 NOTE — PROGRESS NOTES
Department of Internal Medicine        CHIEF COMPLAINT: Shortness of breath, sore throat, dysphagia    Reason for Admission: Acute viral pharyngitis, positive infectious mononucleosis    HISTORY OF PRESENT ILLNESS:      The patient is a 24 y.o. male who presents with shortness of breath, fever/chills, sore throat and trouble swallowing that initially started 4 days ago. Patient's symptoms with swallowing and shortness of breath got worse over the last 48 hours. Patient states he is actually had symptoms similar to this since January. Patient's mother is at the bedside and case discussed. Patient is feeling little bit better at this time is able to drink liquids fairly easily. BUN/creatinine 11/1.3 on admission with WBC 16,000 with 43% lymphocytes and hemoglobin 14.6. Patient had positive mono test.. Temperature is 98.3 with heart rate 104 blood pressure 144/77 O2 sat 99% room air at rest.  ENT was notified in the ED and they reviewed the chart and did not think the patient needed any surgical intervention at this time time and they will evaluate the patient if the patient has any further problems. 11/12/2021  Patient seen examined on general medical floor. Patient states he feels little bit better today. He is able to swallow liquids better and was eating some solids. Patient does have a scratchy throat. BUN/creatinine improved to 9/0.9 with the patient having a fasting blood sugar 139 with the patient on steroids. Liver enzymes are normal with WBC 15 hemoglobin 13.1. Temperature is 98.5 with heart rate in 97 blood pressure 131/84. O2 sat 98% on room air at rest.  Exam showed the patient still have markedly enlarged tonsils which seemed to be a little bit improved today. Still marked erythema with white exudate on the right tonsil. Past Medical History:    History reviewed. No pertinent past medical history. Past Surgical History:    No past surgical history on file.     Medications Prior to Admission:    @  Prior to Admission medications    Medication Sig Start Date End Date Taking? Authorizing Provider   fluticasone (FLONASE) 50 MCG/ACT nasal spray 2 sprays by Each Nostril route daily   Yes Historical Provider, MD   cefdinir (OMNICEF) 300 MG capsule Take 300 mg by mouth 2 times daily 11/9/21  Yes Historical Provider, MD   methylPREDNISolone (MEDROL DOSEPACK) 4 MG tablet Take 4 mg by mouth See Admin Instructions Take by mouth. 11/9/21  Yes Historical Provider, MD   pseudoephedrine (SUDAFED SINUS CONGESTION 24HR) 240 MG TB24 extended release tablet Take 240 mg by mouth nightly as needed (Congestion)   Yes Historical Provider, MD   Ibuprofen-Acetaminophen (ADVIL DUAL ACTION) 125-250 MG TABS Take 2 tablets by mouth 3 times daily as needed (Pain/Fever)   Yes Historical Provider, MD       Allergies:  Patient has no known allergies. Social History:   Social History     Socioeconomic History    Marital status: Single     Spouse name: Not on file    Number of children: Not on file    Years of education: Not on file    Highest education level: Not on file   Occupational History    Not on file   Tobacco Use    Smoking status: Never Smoker    Smokeless tobacco: Never Used   Vaping Use    Vaping Use: Never used   Substance and Sexual Activity    Alcohol use: Never    Drug use: Never    Sexual activity: Never   Other Topics Concern    Not on file   Social History Narrative    Not on file     Social Determinants of Health     Financial Resource Strain:     Difficulty of Paying Living Expenses: Not on file   Food Insecurity:     Worried About Running Out of Food in the Last Year: Not on file    Christina of Food in the Last Year: Not on file   Transportation Needs:     Lack of Transportation (Medical): Not on file    Lack of Transportation (Non-Medical):  Not on file   Physical Activity:     Days of Exercise per Week: Not on file    Minutes of Exercise per Session: Not on file   Stress:     Feeling of Stress : Not on file   Social Connections:     Frequency of Communication with Friends and Family: Not on file    Frequency of Social Gatherings with Friends and Family: Not on file    Attends Synagogue Services: Not on file    Active Member of Clubs or Organizations: Not on file    Attends Club or Organization Meetings: Not on file    Marital Status: Not on file   Intimate Partner Violence:     Fear of Current or Ex-Partner: Not on file    Emotionally Abused: Not on file    Physically Abused: Not on file    Sexually Abused: Not on file   Housing Stability:     Unable to Pay for Housing in the Last Year: Not on file    Number of [place] in the Last Year: Not on file    Unstable Housing in the Last Year: Not on file       Family History:   No family history on file. REVIEW OF SYSTEMS:    Gen: Patient denies any lightheadedness or dizziness. No LOC or syncope. + fevers or chills. HEENT: No earache, +sore throat, nasal congestion. Resp: Denies cough, hemoptysis or sputum production. Cardiac: Denies chest pain, +SOB, no diaphoresis or palpitations. GI: No nausea, vomiting, diarrhea or constipation. No melena or hematochezia. : No urinary complaints, dysuria, hematuria or frequency. MSK: No extremity weakness, paralysis or paresthesias. PHYSICAL EXAM:    Vitals:  /84   Pulse 97   Temp 98.5 °F (36.9 °C) (Oral)   Resp 18   Ht 6' 4\" (1.93 m)   Wt 226 lb (102.5 kg)   SpO2 98%   BMI 27.51 kg/m²     General:  This is a 24 y.o. yo male who is alert and oriented in NAD  HEENT:  Head is normocephalic and atraumatic, PERRLA, EOMI, mucus membranes moist with + pharyngeal erythema with marked tonsillar enlargement. Neck:  Supple with no carotid bruits, JVD or thyromegaly.   + cervical adenopathy  CV:  Regular rate and rhythm, no murmurs  Lungs:  Clear to auscultation bilaterally with no wheezes, rales or rhonchi  Abdomen:  Soft, nontender, nondistended, bowel sounds present  Extremities:  No edema, peripheral pulses intact bilaterally  Neuro:  Cranial nerves II-XII grossly intact; motor and sensory function intact with no focal deficits  Skin:  No rashes, lesions or wounds    DATA:  CBC with Differential:    Lab Results   Component Value Date    WBC 15.0 11/12/2021    RBC 4.62 11/12/2021    HGB 13.1 11/12/2021    HCT 38.6 11/12/2021     11/12/2021    MCV 83.5 11/12/2021    MCH 28.4 11/12/2021    MCHC 33.9 11/12/2021    RDW 13.0 11/12/2021    METASPCT 0.9 11/12/2021    LYMPHOPCT 16.7 11/12/2021    MONOPCT 1.8 11/12/2021    BASOPCT 0.6 11/12/2021    MONOSABS 0.30 11/12/2021    LYMPHSABS 3.30 11/12/2021    EOSABS 0.00 11/12/2021    BASOSABS 0.00 11/12/2021     CMP:    Lab Results   Component Value Date     11/12/2021    K 4.0 11/12/2021    K 3.6 11/11/2021     11/12/2021    CO2 21 11/12/2021    BUN 9 11/12/2021    CREATININE 0.9 11/12/2021    GFRAA >60 11/12/2021    LABGLOM >60 11/12/2021    GLUCOSE 139 11/12/2021    PROT 7.4 11/12/2021    LABALBU 3.8 11/12/2021    CALCIUM 8.9 11/12/2021    BILITOT 0.4 11/12/2021    ALKPHOS 68 11/12/2021    AST 27 11/12/2021    ALT 35 11/12/2021     Magnesium:    Lab Results   Component Value Date    MG 2.1 11/12/2021     Phosphorus:  No results found for: PHOS  PT/INR:  No results found for: PROTIME, INR  Troponin:  No results found for: TROPONINI  U/A:  No results found for: NITRITE, COLORU, PROTEINU, PHUR, LABCAST, WBCUA, RBCUA, MUCUS, TRICHOMONAS, YEAST, BACTERIA, CLARITYU, SPECGRAV, LEUKOCYTESUR, UROBILINOGEN, BILIRUBINUR, BLOODU, GLUCOSEU, AMORPHOUS  ABG:  No results found for: PH, PCO2, PO2, HCO3, BE, THGB, TCO2, O2SAT  HgBA1c:  No results found for: LABA1C  FLP:    Lab Results   Component Value Date    TRIG 77 11/12/2021    HDL 34 11/12/2021    LDLCALC 62 11/12/2021    LABVLDL 15 11/12/2021     TSH:    Lab Results   Component Value Date    TSH 0.342 11/12/2021     IRON:  No results found for: IRON  LIPASE:  No

## 2021-11-12 NOTE — PLAN OF CARE
Problem: Pain:  Goal: Pain level will decrease  Description: Pain level will decrease  11/12/2021 0947 by Anastasia Valencia RN  Outcome: Met This Shift     Problem: Pain:  Goal: Control of acute pain  Description: Control of acute pain  11/12/2021 0947 by Anastasia Valencia RN  Outcome: Met This Shift     Problem: Pain:  Goal: Control of chronic pain  Description: Control of chronic pain  Outcome: Met This Shift

## 2021-11-13 NOTE — DISCHARGE SUMMARY
Stress:     Feeling of Stress : Not on file   Social Connections:     Frequency of Communication with Friends and Family: Not on file    Frequency of Social Gatherings with Friends and Family: Not on file    Attends Oriental orthodox Services: Not on file    Active Member of Clubs or Organizations: Not on file    Attends Club or Organization Meetings: Not on file    Marital Status: Not on file   Intimate Partner Violence:     Fear of Current or Ex-Partner: Not on file    Emotionally Abused: Not on file    Physically Abused: Not on file    Sexually Abused: Not on file   Housing Stability:     Unable to Pay for Housing in the Last Year: Not on file    Number of [place] in the Last Year: Not on file    Unstable Housing in the Last Year: Not on file       Family History:   No family history on file. REVIEW OF SYSTEMS:    Gen: Patient denies any lightheadedness or dizziness. No LOC or syncope. + fevers or chills. HEENT: No earache, +sore throat, nasal congestion. Resp: Denies cough, hemoptysis or sputum production. Cardiac: Denies chest pain, +SOB, no diaphoresis or palpitations. GI: No nausea, vomiting, diarrhea or constipation. No melena or hematochezia. : No urinary complaints, dysuria, hematuria or frequency. MSK: No extremity weakness, paralysis or paresthesias. PHYSICAL EXAM:    Vitals:  /66   Pulse 97   Temp 97.3 °F (36.3 °C) (Oral)   Resp 18   Ht 6' 4\" (1.93 m)   Wt 226 lb (102.5 kg)   SpO2 99%   BMI 27.51 kg/m²     General:  This is a 24 y.o. yo male who is alert and oriented in NAD  HEENT:  Head is normocephalic and atraumatic, PERRLA, EOMI, mucus membranes moist with + pharyngeal erythema with marked tonsillar enlargement. Neck:  Supple with no carotid bruits, JVD or thyromegaly.   + cervical adenopathy  CV:  Regular rate and rhythm, no murmurs  Lungs:  Clear to auscultation bilaterally with no wheezes, rales or rhonchi  Abdomen:  Soft, nontender, nondistended, bowel sounds present  Extremities:  No edema, peripheral pulses intact bilaterally  Neuro:  Cranial nerves II-XII grossly intact; motor and sensory function intact with no focal deficits  Skin:  No rashes, lesions or wounds    DATA:  CBC with Differential:    Lab Results   Component Value Date    WBC 15.0 11/12/2021    RBC 4.62 11/12/2021    HGB 13.1 11/12/2021    HCT 38.6 11/12/2021     11/12/2021    MCV 83.5 11/12/2021    MCH 28.4 11/12/2021    MCHC 33.9 11/12/2021    RDW 13.0 11/12/2021    METASPCT 0.9 11/12/2021    LYMPHOPCT 16.7 11/12/2021    MONOPCT 1.8 11/12/2021    BASOPCT 0.6 11/12/2021    MONOSABS 0.30 11/12/2021    LYMPHSABS 3.30 11/12/2021    EOSABS 0.00 11/12/2021    BASOSABS 0.00 11/12/2021     CMP:    Lab Results   Component Value Date     11/12/2021    K 4.0 11/12/2021    K 3.6 11/11/2021     11/12/2021    CO2 21 11/12/2021    BUN 9 11/12/2021    CREATININE 0.9 11/12/2021    GFRAA >60 11/12/2021    LABGLOM >60 11/12/2021    GLUCOSE 139 11/12/2021    PROT 7.4 11/12/2021    LABALBU 3.8 11/12/2021    CALCIUM 8.9 11/12/2021    BILITOT 0.4 11/12/2021    ALKPHOS 68 11/12/2021    AST 27 11/12/2021    ALT 35 11/12/2021     Magnesium:    Lab Results   Component Value Date    MG 2.1 11/12/2021     Phosphorus:  No results found for: PHOS  PT/INR:  No results found for: PROTIME, INR  Troponin:  No results found for: TROPONINI  U/A:  No results found for: NITRITE, COLORU, PROTEINU, PHUR, LABCAST, WBCUA, RBCUA, MUCUS, TRICHOMONAS, YEAST, BACTERIA, CLARITYU, SPECGRAV, LEUKOCYTESUR, UROBILINOGEN, BILIRUBINUR, BLOODU, GLUCOSEU, AMORPHOUS  ABG:  No results found for: PH, PCO2, PO2, HCO3, BE, THGB, TCO2, O2SAT  HgBA1c:  No results found for: LABA1C  FLP:    Lab Results   Component Value Date    TRIG 77 11/12/2021    HDL 34 11/12/2021    LDLCALC 62 11/12/2021    LABVLDL 15 11/12/2021     TSH:    Lab Results   Component Value Date    TSH 0.342 11/12/2021     IRON:  No results found for: IRON  LIPASE:  No results found for: LIPASE    ASSESSMENT AND PLAN:      Patient Active Problem List    Diagnosis Date Noted    Acute tonsillitis due to infectious mononucleosis  Acute kidney injury  Sepsis secondary to infectious mononucleosis 11/11/2021     Plan:  Discharge home today    Decadron 6 mg p.o.  3 times daily for 5 days    Continue Sudafed sinus along with Flonase and over-the-counter Advil/Tylenol     Patient to follow-up primary care physician next week in the office      Isabella Goodson DO, D.O.  11/13/2021  10:00 AM

## 2021-11-15 ENCOUNTER — OFFICE VISIT (OUTPATIENT)
Dept: ENT CLINIC | Age: 21
End: 2021-11-15
Payer: COMMERCIAL

## 2021-11-15 VITALS — BODY MASS INDEX: 27.61 KG/M2 | HEIGHT: 76 IN | WEIGHT: 226.7 LBS

## 2021-11-15 DIAGNOSIS — B27.90 ACUTE TONSILLITIS DUE TO INFECTIOUS MONONUCLEOSIS: Primary | ICD-10-CM

## 2021-11-15 DIAGNOSIS — R59.0 CERVICAL LYMPHADENOPATHY: ICD-10-CM

## 2021-11-15 DIAGNOSIS — J03.80 ACUTE TONSILLITIS DUE TO INFECTIOUS MONONUCLEOSIS: Primary | ICD-10-CM

## 2021-11-15 PROCEDURE — 99203 OFFICE O/P NEW LOW 30 MIN: CPT | Performed by: OTOLARYNGOLOGY

## 2021-11-15 PROCEDURE — G8484 FLU IMMUNIZE NO ADMIN: HCPCS | Performed by: OTOLARYNGOLOGY

## 2021-11-15 PROCEDURE — G8419 CALC BMI OUT NRM PARAM NOF/U: HCPCS | Performed by: OTOLARYNGOLOGY

## 2021-11-15 PROCEDURE — G8427 DOCREV CUR MEDS BY ELIG CLIN: HCPCS | Performed by: OTOLARYNGOLOGY

## 2021-11-15 PROCEDURE — 1036F TOBACCO NON-USER: CPT | Performed by: OTOLARYNGOLOGY

## 2021-11-15 RX ORDER — ACETAMINOPHEN 500 MG
500 TABLET ORAL EVERY 6 HOURS PRN
COMMUNITY
End: 2022-07-21 | Stop reason: ALTCHOICE

## 2021-11-15 RX ORDER — CLINDAMYCIN HYDROCHLORIDE 300 MG/1
300 CAPSULE ORAL 2 TIMES DAILY
Qty: 14 CAPSULE | Refills: 0 | Status: SHIPPED | OUTPATIENT
Start: 2021-11-15 | End: 2021-11-22

## 2021-11-15 ASSESSMENT — ENCOUNTER SYMPTOMS
STRIDOR: 0
COLOR CHANGE: 0
SHORTNESS OF BREATH: 0
VOMITING: 0
ALLERGIC/IMMUNOLOGIC NEGATIVE: 1
NAUSEA: 0
EYE REDNESS: 0
VOICE CHANGE: 1
COUGH: 0
EYE DISCHARGE: 0
SORE THROAT: 1

## 2021-11-15 NOTE — PROGRESS NOTES
[id number] [facility]  Dr. Rosemarie Garcia. DMITRY Wood Ms.Ed. New Consult       Patient Name:  Daylin Kennedy  :  2000     CHIEF C/O:    Chief Complaint   Patient presents with    Pharyngitis     in hospital PTA        HISTORY OBTAINED FROM:  patient    HISTORY OF PRESENT ILLNESS:       Rosario Maciel is a 24y.o. year old male, here today for eval of his tonsils. He was diagnosed with mono 1 week ago. CT done over the weekend showing tonsillitis without abscess. Pt notes fatigue and sore throat. Denies previous HENT surgery. No past medical history on file. No past surgical history on file. Current Outpatient Medications:     acetaminophen (TYLENOL) 500 MG tablet, Take 500 mg by mouth every 6 hours as needed for Pain, Disp: , Rfl:     dexamethasone (DECADRON) 6 MG tablet, Take 1 tablet by mouth 3 times daily for 5 days, Disp: 15 tablet, Rfl: 0    fluticasone (FLONASE) 50 MCG/ACT nasal spray, 2 sprays by Each Nostril route daily, Disp: , Rfl:     pseudoephedrine (SUDAFED SINUS CONGESTION 24HR) 240 MG TB24 extended release tablet, Take 240 mg by mouth nightly as needed (Congestion), Disp: , Rfl:     cefdinir (OMNICEF) 300 MG capsule, Take 300 mg by mouth 2 times daily (Patient not taking: Reported on 11/15/2021), Disp: , Rfl:     Ibuprofen-Acetaminophen (ADVIL DUAL ACTION) 125-250 MG TABS, Take 2 tablets by mouth 3 times daily as needed (Pain/Fever), Disp: , Rfl:   Patient has no known allergies. Social History     Tobacco Use    Smoking status: Never Smoker    Smokeless tobacco: Never Used   Vaping Use    Vaping Use: Never used   Substance Use Topics    Alcohol use: Never    Drug use: Never     No family history on file. Review of Systems   Constitutional: Negative for chills, fatigue and fever. HENT: Positive for sore throat and voice change. Eyes: Negative for discharge and redness. Respiratory: Negative for cough, shortness of breath and stridor.     Gastrointestinal: Negative for nausea and vomiting. Endocrine: Negative. Genitourinary: Negative. Musculoskeletal: Negative. Skin: Negative for color change and rash. Allergic/Immunologic: Negative. Neurological: Negative for dizziness, speech difficulty and headaches. Hematological: Negative. Psychiatric/Behavioral: Negative for agitation and confusion. All other systems reviewed and are negative. Ht 6' 4\" (1.93 m)   Wt 226 lb 11.2 oz (102.8 kg)   BMI 27.59 kg/m²   Physical Exam  HENT:      Head: Normocephalic. Right Ear: External ear normal.      Left Ear: External ear normal.      Nose: Nose normal.      Mouth/Throat:      Mouth: Mucous membranes are moist.     Eyes:      Pupils: Pupils are equal, round, and reactive to light. Musculoskeletal:         General: Normal range of motion. Skin:     General: Skin is warm and dry. Neurological:      General: No focal deficit present. Mental Status: He is alert and oriented to person, place, and time. IMPRESSION/PLAN:  Acute tonsillitis secondary to mono  Cervical lymphadenopathy secondary to above  Cont supportive care- steroids, hydration, rest; Rx Clinda   Avoid contact sports   No indication for tonsillectomy at this point, no evidence of PTA  Follow up 2 weeks    Electronically signed by Finn Saravia DO on 11/15/2021 at 2:56 PM          Izabela Huertas  2000      I have discussed the case, including pertinent history and exam findings with the resident. I have seen and examined the patient and the key elements of the encounter have been performed by me. I agree with the assessment, plan and orders as documented by the resident. Patient here for follow up of medical problems. Remainder of medical problems as per resident note.       [address] [facility]   12/2/21

## 2021-11-15 NOTE — LETTER
[facility] ENT  [address] [place] [id number]  Phone: [phone]  Fax: [address] [place] [address] [facility], DO        November 15, 2021     Patient: Dilam Atwood   YOB: 2000   Date of Visit: 11/15/2021       To Whom it May Concern:    Dilma Atwood was seen in my clinic on 11/15/2021. Due to his diagnosis of Mono he needs to be out of school until released by me. If you have any questions or concerns, please don't hesitate to call.     Sincerely,         Paul Wallace, DO

## 2021-11-16 LAB
BLOOD CULTURE, ROUTINE: NORMAL
CULTURE, BLOOD 2: NORMAL

## 2021-11-29 ENCOUNTER — OFFICE VISIT (OUTPATIENT)
Dept: ENT CLINIC | Age: 21
End: 2021-11-29
Payer: COMMERCIAL

## 2021-11-29 VITALS
SYSTOLIC BLOOD PRESSURE: 128 MMHG | BODY MASS INDEX: 26.85 KG/M2 | HEIGHT: 76 IN | HEART RATE: 100 BPM | WEIGHT: 220.5 LBS | DIASTOLIC BLOOD PRESSURE: 67 MMHG

## 2021-11-29 DIAGNOSIS — R59.0 CERVICAL LYMPHADENOPATHY: ICD-10-CM

## 2021-11-29 DIAGNOSIS — B27.90 ACUTE TONSILLITIS DUE TO INFECTIOUS MONONUCLEOSIS: Primary | ICD-10-CM

## 2021-11-29 DIAGNOSIS — J03.80 ACUTE TONSILLITIS DUE TO INFECTIOUS MONONUCLEOSIS: Primary | ICD-10-CM

## 2021-11-29 PROCEDURE — G8427 DOCREV CUR MEDS BY ELIG CLIN: HCPCS | Performed by: OTOLARYNGOLOGY

## 2021-11-29 PROCEDURE — G8484 FLU IMMUNIZE NO ADMIN: HCPCS | Performed by: OTOLARYNGOLOGY

## 2021-11-29 PROCEDURE — 1036F TOBACCO NON-USER: CPT | Performed by: OTOLARYNGOLOGY

## 2021-11-29 PROCEDURE — G8419 CALC BMI OUT NRM PARAM NOF/U: HCPCS | Performed by: OTOLARYNGOLOGY

## 2021-11-29 PROCEDURE — 99212 OFFICE O/P EST SF 10 MIN: CPT | Performed by: OTOLARYNGOLOGY

## 2021-11-29 ASSESSMENT — ENCOUNTER SYMPTOMS
VOICE CHANGE: 1
VOMITING: 0
NAUSEA: 0
EYE DISCHARGE: 0
COLOR CHANGE: 0
STRIDOR: 0
ALLERGIC/IMMUNOLOGIC NEGATIVE: 1
EYE REDNESS: 0
COUGH: 0
SORE THROAT: 1
SHORTNESS OF BREATH: 0

## 2021-11-29 NOTE — PROGRESS NOTES
[address] [facility] Otolaryngology  Dr. Michael Brewster. DMITRY Wood Ms.Ed. New Consult       Patient Name:  John Paul Ochoa  :  2000     CHIEF C/O:    Chief Complaint   Patient presents with    Follow-up     2 wk follow up throat,        HISTORY OBTAINED FROM:  patient    HISTORY OF PRESENT ILLNESS:       Shanthi Santiago is a 24y.o. year old male, here today for eval of his tonsils. He was diagnosed with mono 1 week ago. CT done over the weekend showing tonsillitis without abscess. Pt notes fatigue and sore throat. Denies previous HENT surgery. 21: here for follow up of exudative tonsillitis from mono. Patient completed clindamycin and steroids and is feeling much better now. History reviewed. No pertinent past medical history. History reviewed. No pertinent surgical history. Current Outpatient Medications:     acetaminophen (TYLENOL) 500 MG tablet, Take 500 mg by mouth every 6 hours as needed for Pain, Disp: , Rfl:     fluticasone (FLONASE) 50 MCG/ACT nasal spray, 2 sprays by Each Nostril route daily, Disp: , Rfl:     cefdinir (OMNICEF) 300 MG capsule, Take 300 mg by mouth 2 times daily (Patient not taking: Reported on 11/15/2021), Disp: , Rfl:     pseudoephedrine (SUDAFED SINUS CONGESTION 24HR) 240 MG TB24 extended release tablet, Take 240 mg by mouth nightly as needed (Congestion) (Patient not taking: Reported on 2021), Disp: , Rfl:     Ibuprofen-Acetaminophen (ADVIL DUAL ACTION) 125-250 MG TABS, Take 2 tablets by mouth 3 times daily as needed (Pain/Fever) (Patient not taking: Reported on 2021), Disp: , Rfl:   Patient has no known allergies. Social History     Tobacco Use    Smoking status: Never Smoker    Smokeless tobacco: Never Used   Vaping Use    Vaping Use: Never used   Substance Use Topics    Alcohol use: Never    Drug use: Never     History reviewed. No pertinent family history. Review of Systems   Constitutional: Negative for chills, fatigue and fever.    HENT: Positive for sore throat and voice change. Eyes: Negative for discharge and redness. Respiratory: Negative for cough, shortness of breath and stridor. Gastrointestinal: Negative for nausea and vomiting. Endocrine: Negative. Genitourinary: Negative. Musculoskeletal: Negative. Skin: Negative for color change and rash. Allergic/Immunologic: Negative. Neurological: Negative for dizziness, speech difficulty and headaches. Hematological: Negative. Psychiatric/Behavioral: Negative for agitation and confusion. All other systems reviewed and are negative. /67   Pulse 100   Ht 6' 4\" (1.93 m)   Wt 220 lb 8 oz (100 kg)   BMI 26.84 kg/m²   Physical Exam  HENT:      Head: Normocephalic. Right Ear: External ear normal.      Left Ear: External ear normal.      Nose: Nose normal.      Mouth/Throat:      Mouth: Mucous membranes are moist.     Eyes:      Pupils: Pupils are equal, round, and reactive to light. Musculoskeletal:         General: Normal range of motion. Skin:     General: Skin is warm and dry. Neurological:      General: No focal deficit present. Mental Status: He is alert and oriented to person, place, and time. IMPRESSION/PLAN:  Acute tonsillitis secondary to mono which resolved after supportive care- steroids, hydration and clindamycin. No indication for tonsillectomy at this point, no evidence of PTA. Return to clinic if needed.      Electronically signed by Jose Antonio Ambrocio DO on 11/29/2021 at 3:08 PM

## 2022-07-06 ENCOUNTER — TELEPHONE (OUTPATIENT)
Dept: ENT CLINIC | Age: 22
End: 2022-07-06

## 2022-07-06 NOTE — TELEPHONE ENCOUNTER
Mother called and stated that Son's  tonsils are touching again & he has a sore throat would like an appointment. If he can't get in soon Can he order anything between now and when he can be seen. Pharmacy is [facility] [address] [facility], sleeping a lot again also just like last time . Scheduled for Monday the 11th.

## 2022-07-07 RX ORDER — METHYLPREDNISOLONE 4 MG/1
4 TABLET ORAL SEE ADMIN INSTRUCTIONS
Qty: 1 KIT | Refills: 1 | Status: SHIPPED | OUTPATIENT
Start: 2022-07-07 | End: 2022-07-13

## 2022-07-07 RX ORDER — AMOXICILLIN 500 MG/1
500 CAPSULE ORAL 2 TIMES DAILY
Qty: 20 CAPSULE | Refills: 0 | Status: SHIPPED | OUTPATIENT
Start: 2022-07-07 | End: 2022-07-17

## 2022-07-18 ENCOUNTER — OFFICE VISIT (OUTPATIENT)
Dept: ENT CLINIC | Age: 22
End: 2022-07-18
Payer: COMMERCIAL

## 2022-07-18 VITALS
HEIGHT: 76 IN | WEIGHT: 222.5 LBS | HEART RATE: 82 BPM | SYSTOLIC BLOOD PRESSURE: 121 MMHG | BODY MASS INDEX: 27.09 KG/M2 | DIASTOLIC BLOOD PRESSURE: 80 MMHG

## 2022-07-18 DIAGNOSIS — R59.0 CERVICAL LYMPHADENOPATHY: ICD-10-CM

## 2022-07-18 DIAGNOSIS — J03.80 ACUTE TONSILLITIS DUE TO INFECTIOUS MONONUCLEOSIS: Primary | ICD-10-CM

## 2022-07-18 DIAGNOSIS — B27.90 ACUTE TONSILLITIS DUE TO INFECTIOUS MONONUCLEOSIS: Primary | ICD-10-CM

## 2022-07-18 PROCEDURE — 99203 OFFICE O/P NEW LOW 30 MIN: CPT | Performed by: OTOLARYNGOLOGY

## 2022-07-18 PROCEDURE — G8427 DOCREV CUR MEDS BY ELIG CLIN: HCPCS | Performed by: OTOLARYNGOLOGY

## 2022-07-18 PROCEDURE — G8419 CALC BMI OUT NRM PARAM NOF/U: HCPCS | Performed by: OTOLARYNGOLOGY

## 2022-07-18 PROCEDURE — 1036F TOBACCO NON-USER: CPT | Performed by: OTOLARYNGOLOGY

## 2022-07-18 RX ORDER — CHLORHEXIDINE GLUCONATE 0.12 MG/ML
15 RINSE ORAL 2 TIMES DAILY
Qty: 420 ML | Refills: 0 | Status: SHIPPED | OUTPATIENT
Start: 2022-07-18 | End: 2022-08-01

## 2022-07-18 ASSESSMENT — ENCOUNTER SYMPTOMS
COUGH: 0
VOMITING: 0
SHORTNESS OF BREATH: 0
VOICE CHANGE: 0
SORE THROAT: 1
TROUBLE SWALLOWING: 1
RHINORRHEA: 0

## 2022-07-18 NOTE — PROGRESS NOTES
[address] [facility] Otolaryngology  Dr. Channing Mendoza. Jerzy Wheeler. Ms.Ed        Patient Name:  Hernando Hernandez  :  2000     CHIEF C/O:    Chief Complaint   Patient presents with    Pharyngitis     Bilateral tonsil swelling X2 months, cough (subsided with abx), yellow in color, difficulty with drinking and eating. HISTORY OBTAINED FROM:  patient    HISTORY OF PRESENT ILLNESS:       Matt James is a 25y.o. year old male, here today for follow up of recurrent tonsillitis. He has had a history of recurrent's with tonsillar swelling, with a current need for antibiotics has been increasing over the last 1 year, Corker antibiotics 3-4 times. Patient did have a history ofmono approximately 2 years ago and most of the symptoms of increase in episodes of sore throat and recurrent tonsillitis occurred after the mono diagnosis. Denies any shortness of breath or stridor is having difficulty swallowing denies any ear pain or hearing loss. Denies any neck mass tumor lesions no imaging studies been conducted. No past medical history on file. No past surgical history on file. Current Outpatient Medications:     acetaminophen (TYLENOL) 500 MG tablet, Take 500 mg by mouth every 6 hours as needed for Pain, Disp: , Rfl:     fluticasone (FLONASE) 50 MCG/ACT nasal spray, 2 sprays by Each Nostril route daily, Disp: , Rfl:   Patient has no known allergies. Social History     Tobacco Use    Smoking status: Never    Smokeless tobacco: Never   Vaping Use    Vaping Use: Never used   Substance Use Topics    Alcohol use: Yes     Comment: socially    Drug use: Never     No family history on file. Review of Systems   Constitutional:  Negative for chills and fever. HENT:  Positive for postnasal drip, sore throat and trouble swallowing. Negative for congestion, ear discharge, hearing loss, rhinorrhea and voice change. Respiratory:  Negative for cough and shortness of breath.     Cardiovascular:  Negative for chest pain and palpitations. Gastrointestinal:  Negative for vomiting. Skin:  Negative for rash. Allergic/Immunologic: Negative for environmental allergies. Neurological:  Negative for dizziness and headaches. Hematological:  Does not bruise/bleed easily. All other systems reviewed and are negative. /80 (Site: Left Upper Arm, Position: Sitting, Cuff Size: Large Adult)   Pulse 82   Ht 6' 4\" (1.93 m)   Wt 222 lb 8 oz (100.9 kg)   BMI 27.08 kg/m²   Physical Exam  Vitals and nursing note reviewed. Constitutional:       Appearance: He is well-developed. HENT:      Head: No contusion, masses or laceration. Jaw: No tenderness or swelling. Right Ear: Tympanic membrane, ear canal and external ear normal. Decreased hearing noted. There is no impacted cerumen. Left Ear: Tympanic membrane, ear canal and external ear normal. There is no impacted cerumen. Nose: Septal deviation (DNS left) present. No mucosal edema, congestion or rhinorrhea. Right Nostril: No epistaxis. Left Nostril: No epistaxis. Right Turbinates: Not enlarged or swollen. Left Turbinates: Not enlarged or swollen. Mouth/Throat:      Mouth: Mucous membranes are moist. No oral lesions. Dentition: No gum lesions. Tonsils: 4+ on the right. 4+ on the left. Eyes:      Conjunctiva/sclera: Conjunctivae normal.      Pupils: Pupils are equal, round, and reactive to light. Cardiovascular:      Rate and Rhythm: Normal rate. Pulmonary:      Effort: Pulmonary effort is normal. No respiratory distress. Breath sounds: Normal breath sounds. Abdominal:      General: There is no distension. Tenderness: There is no abdominal tenderness. There is no guarding. Musculoskeletal:         General: Normal range of motion. Cervical back: Normal range of motion and neck supple. Skin:     General: Skin is warm and dry.    Neurological:      Mental Status: He is alert and oriented to person, place, and time. Psychiatric:         Behavior: Behavior normal.         Thought Content: Thought content normal.         Judgment: Judgment normal.       IMPRESSION/PLAN:  Discussed r/b/a for tonsillectomy  Follow up 2 wk post op    Dr. Ortega Modi.  Otolaryngology Facial Plastic Surgery  :  [facility] Otolaryngology/Facial Plastic Surgery Residency  Associate Clinical Professor:  SAJAN Roth  [facility]

## 2022-07-18 NOTE — PATIENT INSTRUCTIONS
SURGERY:__7___/___28__/__22___    Nothing to eat or drink after midnight the night before surgery unless surgery is at [facility] or otherwise instructed by the hospital.    DO NOT TAKE ANY ASPIRIN PRODUCTS 7 days prior to surgery. Tylenol only. No Advil, Motrin, Aleve, or Ibuprofen. IF YOU ARE ON BLOOD THINNERS (PLAVIX, COUMADIN, ELIQUIS ETC) THESE WILL ALSO NEED TO BE HELD. Any illegal drugs in your system (including Marijuana even if legally prescribed) will result in your surgery being cancelled. Please be sure to check with our office or the hospital on time frame for the drugs to be out of your system. SHOULD YOUR INSURANCE CHANGE AT ANY TIME YOU MUST CONTACT OUR OFFICE. FAILURE TO DO SO MAY RESULT IN YOUR SURGERY BEING RESCHEDULED OR YOU MAY BE CHARGED AS SELF-PAY. Due to the high demand for surgery at our practice, if you cancel or reschedule your surgery two (2) times we may not reschedule you. If you need FMLA or Short Term Disability paperwork completed for your surgery, please complete your portion, ensure your name and date of birth are on them and fax them to [phone] asap. Paperwork can take up to 2 weeks to be completed. Per current [facility] protocols, COVID Testing is NOT required prior to procedure UNLESS you are symptomatic. (Vaccinated or Unvaccinated)- [facility] requires COVID Testing 72 hours prior to surgery. If you need medical clearance, you are responsible to contact your physician(s) to schedule an appointment for clearance. If clearance is not completed within 30 days of your surgery it may be cancelled. Our office will fax the appropriate forms that need to be completed to your physician(s).     The location of your surgery will call you the day prior to your surgery date to let you know what time you have to be there and any other details. (they usually don't call until late afternoon- early evening.)- IF YOU HAVE QUESTIONS REGARDING

## 2022-07-19 ENCOUNTER — TELEPHONE (OUTPATIENT)
Dept: ENT CLINIC | Age: 22
End: 2022-07-19

## 2022-07-19 NOTE — TELEPHONE ENCOUNTER
Prior Authorization Form:      DEMOGRAPHICS:                     Patient Name:  Hugh Cadena  Patient :  2000            Insurance:  Payor: [employer] / Plan: [employer] [address] / Product Type: *No Product type* /   Insurance ID Number:    Payer/Plan Subscr  Sex Relation Sub. Ins. ID Effective Group Num   1.  [address]* 1966 Female Child [id number] 21 [id number]                                   [address]         DIAGNOSIS & PROCEDURE:                       Procedure/Operation: TONSILLECTOMY           CPT Code: 80358    Diagnosis:  RECURRENT TONSILLITS    ICD10 Code: J35.01    Location:  [facility]    Surgeon:  Edith Curling INFORMATION:                          Date: 22    Time: N/A              Anesthesia:  General                                                       Status:  Outpatient        Special Comments:  N/A       Electronically signed by Leeanne Rucker MA on 2022 at 8:46 AM

## 2022-07-27 ENCOUNTER — ANESTHESIA EVENT (OUTPATIENT)
Dept: OPERATING ROOM | Age: 22
End: 2022-07-27
Payer: COMMERCIAL

## 2022-07-27 PROCEDURE — 6360000002 HC RX W HCPCS: Performed by: ANESTHESIOLOGY

## 2022-07-27 NOTE — H&P
Patient Name:  John Paul Ochoa  :  2000      CHIEF C/O:         Chief Complaint   Patient presents with    Pharyngitis       Bilateral tonsil swelling X2 months, cough (subsided with abx), yellow in color, difficulty with drinking and eating. HISTORY OBTAINED FROM:  patient     HISTORY OF PRESENT ILLNESS:       Shanthi Santiago is a 25y.o. year old male, here today for follow up of recurrent tonsillitis. Has had most recent episode for the last 2 months has subsided with abx. Having difficulty drinking and eating. Hx of  mono. Past Medical History   No past medical history on file. Past Surgical History   No past surgical history on file. Current Medication      Current Outpatient Medications:    acetaminophen (TYLENOL) 500 MG tablet, Take 500 mg by mouth every 6 hours as needed for Pain, Disp: , Rfl:    fluticasone (FLONASE) 50 MCG/ACT nasal spray, 2 sprays by Each Nostril route daily, Disp: , Rfl:     Patient has no known allergies. Social History            Tobacco Use    Smoking status: Never    Smokeless tobacco: Never   Vaping Use    Vaping Use: Never used   Substance Use Topics    Alcohol use: Yes       Comment: socially    Drug use: Never      Family History   No family history on file. Review of Systems  Constitutional:  Negative for chills and fever. HENT:  Positive for postnasal drip, sore throat and trouble swallowing. Negative for congestion, ear discharge, hearing loss, rhinorrhea and voice change. Respiratory:  Negative for cough and shortness of breath. Cardiovascular:  Negative for chest pain and palpitations. Gastrointestinal:  Negative for vomiting. Skin:  Negative for rash. Allergic/Immunologic: Negative for environmental allergies. Neurological:  Negative for dizziness and headaches. Hematological:  Does not bruise/bleed easily. All other systems reviewed and are negative.      /80 (Site: Left Upper Arm, Position: Sitting, Cuff Size: Large Adult)   Pulse 82   Ht 6' 4\" (1.93 m)   Wt 222 lb 8 oz (100.9 kg)   BMI 27.08 kg/m²   Physical Exam  HENT:     Head: No contusion, masses or laceration. Jaw: No tenderness or swelling. Right Ear: Tympanic membrane, ear canal and external ear normal. Decreased hearing noted. There is no impacted cerumen. Left Ear: Tympanic membrane, ear canal and external ear normal. There is no impacted cerumen. Nose: Septal deviation (DNS left) present. No mucosal edema, congestion or rhinorrhea. Right Nostril: No epistaxis. Left Nostril: No epistaxis. Right Turbinates: Not enlarged or swollen. Left Turbinates: Not enlarged or swollen. Mouth/Throat:     Mouth: Mucous membranes are moist. No oral lesions. Dentition: No gum lesions. Tonsils: 4+ on the right. 4+ on the left.         IMPRESSION/PLAN:  Discussed r/b/a for tonsillectomy  Follow up 2 wk post op    Electronically signed by Yanick Burrell DO on 7/27/2022 at 3:10 PM

## 2022-07-27 NOTE — ANESTHESIA PRE PROCEDURE
Department of Anesthesiology  Preprocedure Note       Name:  Acosta Mariano   Age:  25 y.o.  :  2000                                          MRN:  [id number]         Date:  2022      Surgeon: Kenneth Vega):  Thad Mackey DO    Procedure: Procedure(s):  TONSILLECTOMY    Medications prior to admission:   Prior to Admission medications    Medication Sig Start Date End Date Taking? Authorizing Provider   chlorhexidine (PERIDEX) 0.12 % solution Take 15 mLs by mouth in the morning and 15 mLs before bedtime. Do all this for 14 days. 22  Dani Wood DO   fluticasone (FLONASE) 50 MCG/ACT nasal spray 2 sprays by Each Nostril route daily    Historical Provider, MD       Current medications:    No current facility-administered medications for this encounter. Current Outpatient Medications   Medication Sig Dispense Refill    chlorhexidine (PERIDEX) 0.12 % solution Take 15 mLs by mouth in the morning and 15 mLs before bedtime. Do all this for 14 days.  420 mL 0    fluticasone (FLONASE) 50 MCG/ACT nasal spray 2 sprays by Each Nostril route daily         Allergies:  No Known Allergies    Problem List:    Patient Active Problem List   Diagnosis Code    Acute tonsillitis due to infectious mononucleosis J03.80, B27.90       Past Medical History:        Diagnosis Date    COVID-19 2021    upper resp  body aches       Past Surgical History:        Procedure Laterality Date    WISDOM TOOTH EXTRACTION         Social History:    Social History     Tobacco Use    Smoking status: Never    Smokeless tobacco: Never   Substance Use Topics    Alcohol use: Yes     Comment: socially                                Counseling given: Not Answered      Vital Signs (Current):   Vitals:    22 1533   Weight: 222 lb (100.7 kg)   Height: 6' 4\" (1.93 m)                                              BP Readings from Last 3 Encounters:   22 121/80   21 128/67   21 134/66       NPO Status: BMI:   Wt Readings from Last 3 Encounters:   07/18/22 222 lb 8 oz (100.9 kg)   11/29/21 220 lb 8 oz (100 kg)   11/15/21 226 lb 11.2 oz (102.8 kg)     Body mass index is 27.02 kg/m². CBC:   Lab Results   Component Value Date/Time    WBC 15.0 11/12/2021 05:22 AM    RBC 4.62 11/12/2021 05:22 AM    HGB 13.1 11/12/2021 05:22 AM    HCT 38.6 11/12/2021 05:22 AM    MCV 83.5 11/12/2021 05:22 AM    RDW 13.0 11/12/2021 05:22 AM     11/12/2021 05:22 AM       CMP:   Lab Results   Component Value Date/Time     11/12/2021 05:22 AM    K 4.0 11/12/2021 05:22 AM    K 3.6 11/11/2021 09:59 AM     11/12/2021 05:22 AM    CO2 21 11/12/2021 05:22 AM    BUN 9 11/12/2021 05:22 AM    CREATININE 0.9 11/12/2021 05:22 AM    GFRAA >60 11/12/2021 05:22 AM    LABGLOM >60 11/12/2021 05:22 AM    GLUCOSE 139 11/12/2021 05:22 AM    PROT 7.4 11/12/2021 05:22 AM    CALCIUM 8.9 11/12/2021 05:22 AM    BILITOT 0.4 11/12/2021 05:22 AM    ALKPHOS 68 11/12/2021 05:22 AM    AST 27 11/12/2021 05:22 AM    ALT 35 11/12/2021 05:22 AM       POC Tests: No results for input(s): POCGLU, POCNA, POCK, POCCL, POCBUN, POCHEMO, POCHCT in the last 72 hours.     Coags: No results found for: PROTIME, INR, APTT    HCG (If Applicable): No results found for: PREGTESTUR, PREGSERUM, HCG, HCGQUANT     ABGs: No results found for: PHART, PO2ART, AME2VZO, BSY2DOP, BEART, [id number]     Type & Screen (If Applicable):  No results found for: LABABO, LABRH    Drug/Infectious Status (If Applicable):  No results found for: HIV, HEPCAB    COVID-19 Screening (If Applicable): No results found for: COVID19        Anesthesia Evaluation  Patient summary reviewed and Nursing notes reviewed no history of anesthetic complications:   Airway: Mallampati: I  TM distance: >3 FB   Neck ROM: full  Mouth opening: > = 3 FB   Dental:          Pulmonary:normal exam  breath sounds clear to auscultation      (-) COPD, asthma, sleep apnea and not a current smoker                          ROS comment: Seasonal allergies  History of COVID-19   Cardiovascular:  Exercise tolerance: good (>4 METS),       (-) past MI, CAD, CABG/stent, dysrhythmias and  angina      Rhythm: regular  Rate: normal           Beta Blocker:  Not on Beta Blocker         Neuro/Psych:   Negative Neuro/Psych ROS     (-) seizures, TIA and CVA           GI/Hepatic/Renal: Neg GI/Hepatic/Renal ROS       (-) hepatitis and no renal disease       Endo/Other: Negative Endo/Other ROS       (-) diabetes mellitus, blood dyscrasia        Pt had no PAT visit       Abdominal:         (-) obese       Vascular: negative vascular ROS. - DVT and PE. Other Findings:           Anesthesia Plan      general     ASA 2     (Modified RSI with HOB at 30 degrees RT  Pre-oxygenation x 3 minutes  20mg Ketamine  PONV prophylaxis)  Induction: intravenous. MIPS: Postoperative opioids intended and Prophylactic antiemetics administered. Anesthetic plan and risks discussed with patient. Plan discussed with CRNA. History, data and pertinent studies from chart review. Above represents information available via the shared medical record including previous anesthetic, medication and allergy history. Confirmation of above and final disposition per DOS anesthesiologist.        Sandra Barreto MD   7/27/2022    DOS STAFF ADDENDUM:    Patient seen and chart reviewed on DOS. No interval change in history or exam.   I agree with the anesthesia pre-operative assessment written above and have made the appropriate addendums and/or changes. Anesthesia plan discussed and risks/benefits addressed. Patient's concerns and questions answered. NPO >8 hours.      Sara Ndiaye DO  July 28, 2022  11:32 AM

## 2022-07-28 ENCOUNTER — ANESTHESIA (OUTPATIENT)
Dept: OPERATING ROOM | Age: 22
End: 2022-07-28
Payer: COMMERCIAL

## 2022-07-28 ENCOUNTER — HOSPITAL ENCOUNTER (OUTPATIENT)
Age: 22
Setting detail: OUTPATIENT SURGERY
Discharge: HOME | End: 2022-07-28
Attending: OTOLARYNGOLOGY | Admitting: OTOLARYNGOLOGY
Payer: COMMERCIAL

## 2022-07-28 VITALS
HEART RATE: 67 BPM | TEMPERATURE: 98.2 F | HEIGHT: 76 IN | WEIGHT: 223.4 LBS | RESPIRATION RATE: 14 BRPM | BODY MASS INDEX: 27.2 KG/M2 | DIASTOLIC BLOOD PRESSURE: 70 MMHG | OXYGEN SATURATION: 100 % | SYSTOLIC BLOOD PRESSURE: 122 MMHG

## 2022-07-28 DIAGNOSIS — G89.18 POST-OP PAIN: Primary | ICD-10-CM

## 2022-07-28 DIAGNOSIS — J03.91 RECURRENT TONSILLITIS: ICD-10-CM

## 2022-07-28 PROCEDURE — 3600000002 HC SURGERY LEVEL 2 BASE: Performed by: OTOLARYNGOLOGY

## 2022-07-28 PROCEDURE — 2720000010 HC SURG SUPPLY STERILE: Performed by: OTOLARYNGOLOGY

## 2022-07-28 PROCEDURE — 88304 TISSUE EXAM BY PATHOLOGIST: CPT

## 2022-07-28 PROCEDURE — 3700000000 HC ANESTHESIA ATTENDED CARE: Performed by: OTOLARYNGOLOGY

## 2022-07-28 PROCEDURE — 3700000001 HC ADD 15 MINUTES (ANESTHESIA): Performed by: OTOLARYNGOLOGY

## 2022-07-28 PROCEDURE — 3600000012 HC SURGERY LEVEL 2 ADDTL 15MIN: Performed by: OTOLARYNGOLOGY

## 2022-07-28 PROCEDURE — 7100000010 HC PHASE II RECOVERY - FIRST 15 MIN: Performed by: OTOLARYNGOLOGY

## 2022-07-28 PROCEDURE — 2580000003 HC RX 258: Performed by: ANESTHESIOLOGY

## 2022-07-28 PROCEDURE — 42826 REMOVAL OF TONSILS: CPT | Performed by: OTOLARYNGOLOGY

## 2022-07-28 PROCEDURE — 7100000000 HC PACU RECOVERY - FIRST 15 MIN: Performed by: OTOLARYNGOLOGY

## 2022-07-28 PROCEDURE — 2709999900 HC NON-CHARGEABLE SUPPLY: Performed by: OTOLARYNGOLOGY

## 2022-07-28 PROCEDURE — 2500000003 HC RX 250 WO HCPCS: Performed by: ANESTHESIOLOGY

## 2022-07-28 PROCEDURE — 6360000002 HC RX W HCPCS: Performed by: ANESTHESIOLOGY

## 2022-07-28 PROCEDURE — 6370000000 HC RX 637 (ALT 250 FOR IP): Performed by: ANESTHESIOLOGY

## 2022-07-28 PROCEDURE — 2500000003 HC RX 250 WO HCPCS: Performed by: NURSE ANESTHETIST, CERTIFIED REGISTERED

## 2022-07-28 PROCEDURE — 6360000002 HC RX W HCPCS: Performed by: NURSE ANESTHETIST, CERTIFIED REGISTERED

## 2022-07-28 PROCEDURE — 7100000011 HC PHASE II RECOVERY - ADDTL 15 MIN: Performed by: OTOLARYNGOLOGY

## 2022-07-28 PROCEDURE — 7100000001 HC PACU RECOVERY - ADDTL 15 MIN: Performed by: OTOLARYNGOLOGY

## 2022-07-28 RX ORDER — MEPERIDINE HYDROCHLORIDE 25 MG/ML
INJECTION INTRAMUSCULAR; INTRAVENOUS; SUBCUTANEOUS PRN
Status: DISCONTINUED | OUTPATIENT
Start: 2022-07-28 | End: 2022-07-28 | Stop reason: SDUPTHER

## 2022-07-28 RX ORDER — DIPHENHYDRAMINE HYDROCHLORIDE 50 MG/ML
INJECTION INTRAMUSCULAR; INTRAVENOUS PRN
Status: DISCONTINUED | OUTPATIENT
Start: 2022-07-28 | End: 2022-07-28 | Stop reason: SDUPTHER

## 2022-07-28 RX ORDER — SODIUM CHLORIDE 0.9 % (FLUSH) 0.9 %
5-40 SYRINGE (ML) INJECTION EVERY 12 HOURS SCHEDULED
Status: DISCONTINUED | OUTPATIENT
Start: 2022-07-28 | End: 2022-07-28 | Stop reason: HOSPADM

## 2022-07-28 RX ORDER — LABETALOL HYDROCHLORIDE 5 MG/ML
5 INJECTION, SOLUTION INTRAVENOUS
Status: DISCONTINUED | OUTPATIENT
Start: 2022-07-28 | End: 2022-07-28 | Stop reason: HOSPADM

## 2022-07-28 RX ORDER — DIPHENHYDRAMINE HYDROCHLORIDE 50 MG/ML
12.5 INJECTION INTRAMUSCULAR; INTRAVENOUS
Status: DISCONTINUED | OUTPATIENT
Start: 2022-07-28 | End: 2022-07-28 | Stop reason: HOSPADM

## 2022-07-28 RX ORDER — HYDROMORPHONE HYDROCHLORIDE 1 MG/ML
0.5 INJECTION, SOLUTION INTRAMUSCULAR; INTRAVENOUS; SUBCUTANEOUS EVERY 5 MIN PRN
Status: DISCONTINUED | OUTPATIENT
Start: 2022-07-28 | End: 2022-07-28 | Stop reason: HOSPADM

## 2022-07-28 RX ORDER — DEXAMETHASONE SODIUM PHOSPHATE 10 MG/ML
INJECTION, SOLUTION INTRAMUSCULAR; INTRAVENOUS PRN
Status: DISCONTINUED | OUTPATIENT
Start: 2022-07-28 | End: 2022-07-28 | Stop reason: SDUPTHER

## 2022-07-28 RX ORDER — METOCLOPRAMIDE HYDROCHLORIDE 5 MG/ML
10 INJECTION INTRAMUSCULAR; INTRAVENOUS ONCE
Status: COMPLETED | OUTPATIENT
Start: 2022-07-28 | End: 2022-07-28

## 2022-07-28 RX ORDER — ONDANSETRON 4 MG/1
4 TABLET, ORALLY DISINTEGRATING ORAL EVERY 8 HOURS PRN
Qty: 21 TABLET | Refills: 0 | Status: SHIPPED | OUTPATIENT
Start: 2022-07-28 | End: 2022-08-04

## 2022-07-28 RX ORDER — FENTANYL CITRATE 50 UG/ML
INJECTION, SOLUTION INTRAMUSCULAR; INTRAVENOUS PRN
Status: DISCONTINUED | OUTPATIENT
Start: 2022-07-28 | End: 2022-07-28 | Stop reason: SDUPTHER

## 2022-07-28 RX ORDER — HYDROCODONE BITARTRATE AND ACETAMINOPHEN 7.5; 325 MG/1; MG/1
1 TABLET ORAL EVERY 6 HOURS PRN
Qty: 28 TABLET | Refills: 0 | Status: SHIPPED | OUTPATIENT
Start: 2022-07-28 | End: 2022-08-04

## 2022-07-28 RX ORDER — ROCURONIUM BROMIDE 10 MG/ML
INJECTION, SOLUTION INTRAVENOUS PRN
Status: DISCONTINUED | OUTPATIENT
Start: 2022-07-28 | End: 2022-07-28 | Stop reason: SDUPTHER

## 2022-07-28 RX ORDER — KETAMINE HYDROCHLORIDE 10 MG/ML
INJECTION, SOLUTION INTRAMUSCULAR; INTRAVENOUS PRN
Status: DISCONTINUED | OUTPATIENT
Start: 2022-07-28 | End: 2022-07-28 | Stop reason: SDUPTHER

## 2022-07-28 RX ORDER — SODIUM CHLORIDE 9 MG/ML
INJECTION, SOLUTION INTRAVENOUS PRN
Status: DISCONTINUED | OUTPATIENT
Start: 2022-07-28 | End: 2022-07-28 | Stop reason: HOSPADM

## 2022-07-28 RX ORDER — ONDANSETRON 2 MG/ML
INJECTION INTRAMUSCULAR; INTRAVENOUS PRN
Status: DISCONTINUED | OUTPATIENT
Start: 2022-07-28 | End: 2022-07-28 | Stop reason: SDUPTHER

## 2022-07-28 RX ORDER — SODIUM CHLORIDE 0.9 % (FLUSH) 0.9 %
5-40 SYRINGE (ML) INJECTION PRN
Status: DISCONTINUED | OUTPATIENT
Start: 2022-07-28 | End: 2022-07-28 | Stop reason: HOSPADM

## 2022-07-28 RX ORDER — MEPERIDINE HYDROCHLORIDE 50 MG/ML
INJECTION INTRAMUSCULAR; INTRAVENOUS; SUBCUTANEOUS PRN
Status: DISCONTINUED | OUTPATIENT
Start: 2022-07-28 | End: 2022-07-28 | Stop reason: SDUPTHER

## 2022-07-28 RX ORDER — MEPERIDINE HYDROCHLORIDE 25 MG/ML
12.5 INJECTION INTRAMUSCULAR; INTRAVENOUS; SUBCUTANEOUS ONCE
Status: DISCONTINUED | OUTPATIENT
Start: 2022-07-28 | End: 2022-07-28 | Stop reason: HOSPADM

## 2022-07-28 RX ORDER — FENTANYL CITRATE 0.05 MG/ML
25 INJECTION, SOLUTION INTRAMUSCULAR; INTRAVENOUS EVERY 5 MIN PRN
Status: DISCONTINUED | OUTPATIENT
Start: 2022-07-28 | End: 2022-07-28 | Stop reason: HOSPADM

## 2022-07-28 RX ORDER — HYDROCODONE BITARTRATE AND ACETAMINOPHEN 5; 325 MG/1; MG/1
1 TABLET ORAL ONCE
Status: COMPLETED | OUTPATIENT
Start: 2022-07-28 | End: 2022-07-28

## 2022-07-28 RX ORDER — GLYCOPYRROLATE 0.2 MG/ML
INJECTION INTRAMUSCULAR; INTRAVENOUS PRN
Status: DISCONTINUED | OUTPATIENT
Start: 2022-07-28 | End: 2022-07-28 | Stop reason: SDUPTHER

## 2022-07-28 RX ORDER — LIDOCAINE HYDROCHLORIDE 20 MG/ML
INJECTION, SOLUTION INTRAVENOUS PRN
Status: DISCONTINUED | OUTPATIENT
Start: 2022-07-28 | End: 2022-07-28 | Stop reason: SDUPTHER

## 2022-07-28 RX ORDER — ACETAMINOPHEN 500 MG
1000 TABLET ORAL ONCE
Status: COMPLETED | OUTPATIENT
Start: 2022-07-28 | End: 2022-07-28

## 2022-07-28 RX ORDER — PROMETHAZINE HYDROCHLORIDE 25 MG/ML
INJECTION, SOLUTION INTRAMUSCULAR; INTRAVENOUS PRN
Status: DISCONTINUED | OUTPATIENT
Start: 2022-07-28 | End: 2022-07-28 | Stop reason: SDUPTHER

## 2022-07-28 RX ORDER — MIDAZOLAM HYDROCHLORIDE 1 MG/ML
INJECTION INTRAMUSCULAR; INTRAVENOUS PRN
Status: DISCONTINUED | OUTPATIENT
Start: 2022-07-28 | End: 2022-07-28 | Stop reason: SDUPTHER

## 2022-07-28 RX ORDER — HYDRALAZINE HYDROCHLORIDE 20 MG/ML
5 INJECTION INTRAMUSCULAR; INTRAVENOUS
Status: DISCONTINUED | OUTPATIENT
Start: 2022-07-28 | End: 2022-07-28 | Stop reason: HOSPADM

## 2022-07-28 RX ORDER — NEOSTIGMINE METHYLSULFATE 1 MG/ML
INJECTION, SOLUTION INTRAVENOUS PRN
Status: DISCONTINUED | OUTPATIENT
Start: 2022-07-28 | End: 2022-07-28 | Stop reason: SDUPTHER

## 2022-07-28 RX ORDER — PROPOFOL 10 MG/ML
INJECTION, EMULSION INTRAVENOUS PRN
Status: DISCONTINUED | OUTPATIENT
Start: 2022-07-28 | End: 2022-07-28 | Stop reason: SDUPTHER

## 2022-07-28 RX ORDER — AMOXICILLIN 500 MG/1
500 CAPSULE ORAL 2 TIMES DAILY
Qty: 14 CAPSULE | Refills: 0 | Status: SHIPPED | OUTPATIENT
Start: 2022-07-28 | End: 2022-08-04

## 2022-07-28 RX ORDER — FAMOTIDINE 10 MG/ML
20 INJECTION, SOLUTION INTRAVENOUS ONCE
Status: COMPLETED | OUTPATIENT
Start: 2022-07-28 | End: 2022-07-28

## 2022-07-28 RX ORDER — SODIUM CHLORIDE, SODIUM LACTATE, POTASSIUM CHLORIDE, CALCIUM CHLORIDE 600; 310; 30; 20 MG/100ML; MG/100ML; MG/100ML; MG/100ML
INJECTION, SOLUTION INTRAVENOUS CONTINUOUS
Status: DISCONTINUED | OUTPATIENT
Start: 2022-07-28 | End: 2022-07-28 | Stop reason: HOSPADM

## 2022-07-28 RX ORDER — PROCHLORPERAZINE EDISYLATE 5 MG/ML
5 INJECTION INTRAMUSCULAR; INTRAVENOUS
Status: DISCONTINUED | OUTPATIENT
Start: 2022-07-28 | End: 2022-07-28 | Stop reason: HOSPADM

## 2022-07-28 RX ADMIN — DEXAMETHASONE SODIUM PHOSPHATE 20 MG: 10 INJECTION, SOLUTION INTRAMUSCULAR; INTRAVENOUS at 12:51

## 2022-07-28 RX ADMIN — MEPERIDINE HYDROCHLORIDE 25 MG: 50 INJECTION, SOLUTION INTRAMUSCULAR; INTRAVENOUS; SUBCUTANEOUS at 13:10

## 2022-07-28 RX ADMIN — LIDOCAINE HYDROCHLORIDE 100 MG: 20 INJECTION, SOLUTION INTRAVENOUS at 12:46

## 2022-07-28 RX ADMIN — PROMETHAZINE HYDROCHLORIDE 12.5 MG: 25 INJECTION INTRAMUSCULAR; INTRAVENOUS at 13:28

## 2022-07-28 RX ADMIN — KETAMINE HYDROCHLORIDE 30 MG: 10 INJECTION INTRAMUSCULAR; INTRAVENOUS at 12:46

## 2022-07-28 RX ADMIN — Medication 3 MG: at 13:28

## 2022-07-28 RX ADMIN — PROPOFOL 200 MG: 10 INJECTION, EMULSION INTRAVENOUS at 12:46

## 2022-07-28 RX ADMIN — GLYCOPYRROLATE 0.6 MG: 0.2 INJECTION INTRAMUSCULAR; INTRAVENOUS at 13:28

## 2022-07-28 RX ADMIN — MEPERIDINE HYDROCHLORIDE 25 MG: 50 INJECTION, SOLUTION INTRAMUSCULAR; INTRAVENOUS; SUBCUTANEOUS at 13:28

## 2022-07-28 RX ADMIN — MEPERIDINE HYDROCHLORIDE 25 MG: 25 INJECTION, SOLUTION INTRAMUSCULAR; INTRAVENOUS; SUBCUTANEOUS at 13:27

## 2022-07-28 RX ADMIN — METOCLOPRAMIDE 10 MG: 5 INJECTION, SOLUTION INTRAMUSCULAR; INTRAVENOUS at 11:27

## 2022-07-28 RX ADMIN — MIDAZOLAM 2 MG: 1 INJECTION INTRAMUSCULAR; INTRAVENOUS at 12:41

## 2022-07-28 RX ADMIN — SODIUM CHLORIDE, POTASSIUM CHLORIDE, SODIUM LACTATE AND CALCIUM CHLORIDE: 600; 310; 30; 20 INJECTION, SOLUTION INTRAVENOUS at 13:47

## 2022-07-28 RX ADMIN — GLYCOPYRROLATE 0.5 MG: 0.2 INJECTION INTRAMUSCULAR; INTRAVENOUS at 13:53

## 2022-07-28 RX ADMIN — FENTANYL CITRATE 100 MCG: 50 INJECTION INTRAMUSCULAR; INTRAVENOUS at 12:46

## 2022-07-28 RX ADMIN — DIPHENHYDRAMINE HYDROCHLORIDE 12.5 MG: 50 INJECTION, SOLUTION INTRAMUSCULAR; INTRAVENOUS at 12:57

## 2022-07-28 RX ADMIN — GLYCOPYRROLATE 0.2 MG: 0.2 INJECTION INTRAMUSCULAR; INTRAVENOUS at 12:51

## 2022-07-28 RX ADMIN — ACETAMINOPHEN 1000 MG: 500 TABLET ORAL at 11:29

## 2022-07-28 RX ADMIN — FAMOTIDINE 20 MG: 10 INJECTION, SOLUTION INTRAVENOUS at 11:27

## 2022-07-28 RX ADMIN — Medication 3 MG: at 13:53

## 2022-07-28 RX ADMIN — PROPOFOL 200 MG: 10 INJECTION, EMULSION INTRAVENOUS at 13:37

## 2022-07-28 RX ADMIN — HYDROCODONE BITARTRATE AND ACETAMINOPHEN 1 TABLET: 5; 325 TABLET ORAL at 14:55

## 2022-07-28 RX ADMIN — ROCURONIUM BROMIDE 40 MG: 10 SOLUTION INTRAVENOUS at 12:46

## 2022-07-28 RX ADMIN — SODIUM CHLORIDE, POTASSIUM CHLORIDE, SODIUM LACTATE AND CALCIUM CHLORIDE: 600; 310; 30; 20 INJECTION, SOLUTION INTRAVENOUS at 11:26

## 2022-07-28 RX ADMIN — ONDANSETRON 4 MG: 2 INJECTION INTRAMUSCULAR; INTRAVENOUS at 12:57

## 2022-07-28 RX ADMIN — ROCURONIUM BROMIDE 35 MG: 10 SOLUTION INTRAVENOUS at 13:37

## 2022-07-28 ASSESSMENT — PAIN DESCRIPTION - LOCATION
LOCATION: THROAT

## 2022-07-28 ASSESSMENT — PAIN SCALES - GENERAL
PAINLEVEL_OUTOF10: 6

## 2022-07-28 ASSESSMENT — PAIN DESCRIPTION - DESCRIPTORS
DESCRIPTORS: ACHING
DESCRIPTORS: SORE
DESCRIPTORS: SORE

## 2022-07-28 ASSESSMENT — PAIN SCALES - WONG BAKER
WONGBAKER_NUMERICALRESPONSE: 0
WONGBAKER_NUMERICALRESPONSE: 0

## 2022-07-28 ASSESSMENT — PAIN - FUNCTIONAL ASSESSMENT: PAIN_FUNCTIONAL_ASSESSMENT: 0-10

## 2022-07-28 ASSESSMENT — LIFESTYLE VARIABLES: SMOKING_STATUS: 0

## 2022-07-28 NOTE — ANESTHESIA POSTPROCEDURE EVALUATION
Department of Anesthesiology  Postprocedure Note    Patient: Jamin Chauhan  MRN: [id number]  YOB: 2000  Date of evaluation: 7/28/2022      Procedure Summary     Date: 07/28/22 Room / Location: [address] [place] [address] 03 / [address] [facility]    Anesthesia Start: 1110 Anesthesia Stop: 0340    Procedure: TONSILLECTOMY Diagnosis:       Recurrent tonsillitis      (Recurrent tonsillitis [J03.91])    Surgeons: Marek Alfaro DO Responsible Provider: Marquita Hanson DO    Anesthesia Type: General ASA Status: 2          Anesthesia Type: General    Jackie Phase I: Jackie Score: 9    Jackie Phase II:        Anesthesia Post Evaluation    Patient location during evaluation: bedside  Patient participation: complete - patient participated  Level of consciousness: awake  Pain score: 2  Airway patency: patent  Nausea & Vomiting: no vomiting and no nausea  Complications: no  Cardiovascular status: hemodynamically stable  Respiratory status: acceptable  Hydration status: stable  Comments: Seen and examined. Progressing as expected. No questions or concerns.   Multimodal analgesia pain management approach

## 2022-07-28 NOTE — H&P
Jamal Oorsco was seen and re-examined preoperatively today, July 28, 2022. There was no substantial change in his physical and medical status. Patient is fit for the proposed surgical procedure. All questions were appropriately addressed and had no further questions regarding the risks, benefits, and alternatives of the procedure. Jamal Orosco and family wished to proceed.     Khurram Paredes DO  Resident Physician  [facility])  Otolaryngology Residency  7/28/2022  2:02 PM

## 2022-07-28 NOTE — OP NOTE
Operative Note      Patient: Flavio Eric  YOB: 2000  MRN: [id number]    Date of Procedure: 7/28/2022    Pre-Op Diagnosis: Recurrent tonsillitis [J03.91]    Post-Op Diagnosis: Same       Procedure(s):  TONSILLECTOMY    Surgeon(s):  Yohana Gross DO    Assistant:   Resident: Monique Downey DO    Anesthesia: General    Estimated Blood Loss (mL): Minimal    Complications: None    Specimens:   * No specimens in log *    Implants:  * No implants in log *      Drains: * No LDAs found *    Findings: enlarged tonsils bilaterally    Detailed Description of Procedure:   Procedure: Pt was consented preoperatively. Taken back into the OR and identified appropriately. Placed in a supine position and given to anesthesia for general induction. Once properly intubated, pt was turned to a 90 deg angle from anesthesia. Pt was prepped and draped in a sterile fashion. A [place]-Monty mouth gag was placed into the pt's oral cavity to give exposure to the tonsils. The instrument was suspended from the charles stand. Starting with the Right, the tonsil was grasped with a curved ran forceps and retracted medially to expose the capsule. The BiZact instrument was used to dissect the tonsil from the capsule and tonsil bed. Bleeding was controlled with bipolar cautery. Minimal bleeding was seen. Once the tonsil was removed, it was given off the table for permanent pathology. Attention was then turned to the Left tonsil, the tonsil was grasped with a curved ran forceps and retracted medially to expose the capsule. The BiZact instrument was used to dissect the tonsil from the capsule and tonsil bed. Bleeding was controlled with bipolar cautery. Minimal bleeding was seen. Once the tonsil was removed, it was given off the table for permanent pathology. The tonsillar beds were then treated until hemostasis was achieved. Pt's stomach was suctioned out with a [place] Sump, minimal bleeding seen.   Pt was turned back to anesthesia for awakening. Upon extubation and awakening, there blood noted in the posterior oropharynx and bleeding was appreciated from the right superior tonsillar fossa. Patient was turned back over to anesthesia for general induction and re-intubation. Once properly intubated, pt was turned to a 90 deg angle from anesthesia. Pt was prepped and draped in a sterile fashion. A [place]-Monty mouth gag was placed into the pt's oral cavity to give exposure to the tonsillar fossas. There was bleeding noted to be coming from the right superior tonsil fossa. Suction bovie was set to 30 coag and was used to spot cauterize any bleeding areas. The tonsillar beds were then treated until hemostasis was achieved. Pt was turned back to anesthesia for awakening. Patient tolerated the procedure well.      Dr. Marin Cifuentes was present for the entire case    Electronically signed by Stephan Perez DO on 7/28/2022 at 11:29 AM

## 2022-07-28 NOTE — ADDENDUM NOTE
Addendum  created 07/28/22 1450 by Cody Paez,     Order list changed, Pharmacy for encounter modified

## 2022-07-28 NOTE — DISCHARGE INSTRUCTIONS
Adenoidectomy and/or Tonsillectomy Post-Op Instructions  ELYSSA Singh M.D.  [phone]     There may be a small amount of bright red blood and then darker blood until the oozing stops. Try to encourage the patient to sleep. The patient will be discharged when criteria for discharge is met. (They must be tolerating oral fluids)  There may be a fever of 100 to 102 degrees for several days. Alternate Tylenol and ibuprofen every 3 hours for pain control and to reduce fever. If the fever is higher, call the doctor. There will often be an earache from the throat pain. This is normal when tonsils or adenoids are removed. The breath will be bad until the healing process is complete. Vigorous physical activity should be avoided for 14 days following tonsil surgery. School should not be resumed until after their first post op appointment unless otherwise instructed by the physician. No driving for at least 24 hours (if applicable)  A responsible adult should be with the patient for at least 24 hours. Diet  Encourage eating and especially drinking of at least 8 ounces per hour. Drinking large swallows of cool liquid is less painful than small swallows. Avoid carbonated and acidic beverages. Food will not cause bleeding. Encourage liquids and soft foods, such as milkshakes, popsicles, and ice cream. Also encourage the childs favorite food. Chewing gum will be helpful and taking Tylenol ½ hour before meals is recommended. Resume pre-op medications (unless otherwise instructed by your doctor)     Excessive Bleeding  Keep your child quiet and sitting up. If large amount of bleeding, contact the doctors office and take the patient to [facility] or [address] [place] [address] [address] will need to make an appointment for a two week post-op exam. Call and make this appointment tomorrow.         If any problems occur or if you have any further questions, please call your doctor as soon as possible. If you find that you cannot reach your doctor but feel that your condition needs a doctors attention go to an emergency room. I have received a copy/ and understand these instructions:     ____________________________________________________________________  (signature patient/responsible adult )           Nausea and Vomiting After Surgery: Care Instructions  Your Care Instructions     After you've had surgery, you may feel sick to your stomach (nauseated) or you may vomit. Sometimes anesthesia can make you feel sick. It's a common side effect and often doesn't last long. Pain also can make you feel sick or vomit. After the anesthesia wears off, you may feel pain from the incision (cut). That pain could then upset your stomach. Taking pain medicine can also make you feelsick to your stomach. Whatever the cause, you may get medicine that can help. There are also somethings you can do at home to prevent nausea and feel better. The doctor has checked you carefully, but problems can develop later. If you notice any problems or new symptoms, get medical treatment right away. Follow-up care is a key part of your treatment and safety. Be sure to make and go to all appointments, and call your doctor if you are having problems. It's also a good idea to know your test results and keep alist of the medicines you take. How can you care for yourself at home? Be safe with medicines. Read and follow all instructions on the label. If the doctor gave you a prescription medicine for pain, take it as prescribed. If you are not taking a prescription pain medicine, ask your doctor if you can take an over-the-counter medicine. Take your pain medicine as soon as you have pain. It works better if you take it before the pain gets bad. Call your doctor if you have any problems with your medicine. Rest in bed until you feel better. To prevent dehydration, drink plenty of fluids.  Choose water and other clear liquids until you feel better. If you have kidney, heart, or liver disease and have to limit fluids, talk with your doctor before you increase the amount of fluids you drink. When you are able to eat, try clear soups, mild foods, and liquids until all symptoms are gone for 12 to 48 hours. Other good choices include dry toast, crackers, cooked cereal, and gelatin dessert, such as Jell-O. Do not smoke. Smoking and being around smoke can make nausea worse. If you need help quitting, talk to your doctor about stop-smoking programs and medicines. These can increase your chances of quitting for good. When should you call for help? Call 911  anytime you think you may need emergency care. For example, call if:    You passed out (lost consciousness). Call your doctor now or seek immediate medical care if:    You have new or worse nausea or vomiting. You are too sick to your stomach to drink any fluids. You cannot keep down fluids. You have symptoms of dehydration, such as:  Dry eyes and a dry mouth. Passing only a little urine. Feeling thirstier than usual.     Your pain medicine is not helping. You are dizzy or lightheaded, or you feel like you may faint. Watch closely for changes in your health, and be sure to contact your doctor if:    You do not get better as expected. Where can you learn more? Go to https://MimocopeMyrio.Security Scorecard. org and sign in to your [employer] account. Enter M536 in the [facility] box to learn more about \"Nausea and Vomiting After Surgery: Care Instructions. \"     If you do not have an account, please click on the \"Sign Up Now\" link. Current as of: March 9, 2022               Content Version: 13.3  © 7298-7393 Healthwise, Incorporated. Care instructions adapted under license by [facility] ([facility]).  If you have questions about a medical condition or this instruction, always ask your healthcare professional. Bobby Giordano disclaims any warranty or liability for your use of this information. Infection After Surgery: Care Instructions  Overview  After surgery, an infection is always possible. It doesn't mean that thesurgery didn't go well. Because an infection can be serious, your doctor has taken steps to manage it. Your doctor checked the infection and cleaned it if necessary. Your doctor may have made an opening in the area so that the pus can drain out. You may have gauze in the cut so that the area will stay open and keep draining. You mayneed antibiotics. You will need to follow up with your doctor to make sure the infection has goneaway. Follow-up care is a key part of your treatment and safety. Be sure to make and go to all appointments, and call your doctor if you are having problems. It's also a good idea to know your test results and keep alist of the medicines you take. How can you care for yourself at home? Make sure your surgeon knows about the infection, especially if you saw another doctor about your symptoms. If your doctor prescribed antibiotics, take them as directed. Do not stop taking them just because you feel better. You need to take the full course of antibiotics. Ask your doctor if you can take an over-the-counter pain medicine, such as acetaminophen (Tylenol), ibuprofen (Advil, Motrin), or naproxen (Aleve). Be safe with medicines. Read and follow all instructions on the label. Do not take two or more pain medicines at the same time unless the doctor told you to. Many pain medicines have acetaminophen, which is Tylenol. Too much acetaminophen (Tylenol) can be harmful. Prop up the area on a pillow anytime you sit or lie down during the next 3 days. Try to keep it above the level of your heart. This will help reduce swelling. Keep the skin clean and dry. You may have a bandage over the cut (incision). A bandage helps the incision heal and protects it. Your doctor will tell you how to take care of this.  Keep it clean and dry. You may have drainage from the wound. If your doctor told you how to care for your incision, follow your doctor's instructions. If you did not get instructions, follow this general advice:  Wash around the incision with clean water 2 times a day. Don't use hydrogen peroxide or alcohol, which can slow healing. When should you call for help? Call your doctor now or seek immediate medical care if:    You have signs that your infection is getting worse, such as: Increased pain, swelling, warmth, or redness in the area. Red streaks leading from the area. Pus draining from the wound. A new or higher fever. Watch closely for changes in your health, and be sure to contact your doctor ifyou have any problems. Where can you learn more? Go to https://PeeplePass."[employer]. org and sign in to your [employer] account. Enter C340 in the [facility] box to learn more about \"Infection After Surgery: Care Instructions. \"     If you do not have an account, please click on the \"Sign Up Now\" link. Current as of: January 20, 2022               Content Version: 13.3  © 2560-1744 Healthwise, Incorporated. Care instructions adapted under license by [facility] ([facility]). If you have questions about a medical condition or this instruction, always ask your healthcare professional. [address] any warranty or liability for your use of this information.

## 2022-08-05 ENCOUNTER — HOSPITAL ENCOUNTER (OUTPATIENT)
Age: 22
Setting detail: OBSERVATION
Discharge: HOME | End: 2022-08-06
Attending: EMERGENCY MEDICINE | Admitting: OTOLARYNGOLOGY
Payer: COMMERCIAL

## 2022-08-05 ENCOUNTER — ANESTHESIA (OUTPATIENT)
Dept: OPERATING ROOM | Age: 22
End: 2022-08-05
Payer: COMMERCIAL

## 2022-08-05 ENCOUNTER — ANESTHESIA EVENT (OUTPATIENT)
Dept: OPERATING ROOM | Age: 22
End: 2022-08-05
Payer: COMMERCIAL

## 2022-08-05 DIAGNOSIS — J95.830 POST-TONSILLECTOMY HEMORRHAGE: ICD-10-CM

## 2022-08-05 DIAGNOSIS — Z98.890 POST-OPERATIVE STATE: Primary | ICD-10-CM

## 2022-08-05 LAB
ABO/RH: NORMAL
ANION GAP SERPL CALCULATED.3IONS-SCNC: 16 MMOL/L (ref 7–16)
ANTIBODY SCREEN: NORMAL
BASOPHILS ABSOLUTE: 0.05 E9/L (ref 0–0.2)
BASOPHILS RELATIVE PERCENT: 0.8 % (ref 0–2)
BUN BLDV-MCNC: 11 MG/DL (ref 6–20)
CALCIUM SERPL-MCNC: 10 MG/DL (ref 8.6–10.2)
CHLORIDE BLD-SCNC: 101 MMOL/L (ref 98–107)
CO2: 24 MMOL/L (ref 22–29)
CREAT SERPL-MCNC: 1.3 MG/DL (ref 0.7–1.2)
EOSINOPHILS ABSOLUTE: 0.31 E9/L (ref 0.05–0.5)
EOSINOPHILS RELATIVE PERCENT: 4.9 % (ref 0–6)
GFR AFRICAN AMERICAN: >60
GFR NON-AFRICAN AMERICAN: >60 ML/MIN/1.73
GLUCOSE BLD-MCNC: 103 MG/DL (ref 74–99)
HCT VFR BLD CALC: 45.9 % (ref 37–54)
HEMOGLOBIN: 15.9 G/DL (ref 12.5–16.5)
IMMATURE GRANULOCYTES #: 0.02 E9/L
IMMATURE GRANULOCYTES %: 0.3 % (ref 0–5)
LACTIC ACID: 1.2 MMOL/L (ref 0.5–2.2)
LYMPHOCYTES ABSOLUTE: 2.89 E9/L (ref 1.5–4)
LYMPHOCYTES RELATIVE PERCENT: 45.2 % (ref 20–42)
MCH RBC QN AUTO: 28.8 PG (ref 26–35)
MCHC RBC AUTO-ENTMCNC: 34.6 % (ref 32–34.5)
MCV RBC AUTO: 83 FL (ref 80–99.9)
MONOCYTES ABSOLUTE: 0.56 E9/L (ref 0.1–0.95)
MONOCYTES RELATIVE PERCENT: 8.8 % (ref 2–12)
NEUTROPHILS ABSOLUTE: 2.56 E9/L (ref 1.8–7.3)
NEUTROPHILS RELATIVE PERCENT: 40 % (ref 43–80)
PDW BLD-RTO: 12.5 FL (ref 11.5–15)
PLATELET # BLD: 323 E9/L (ref 130–450)
PMV BLD AUTO: 8.8 FL (ref 7–12)
POTASSIUM REFLEX MAGNESIUM: 3.7 MMOL/L (ref 3.5–5)
RBC # BLD: 5.53 E12/L (ref 3.8–5.8)
SODIUM BLD-SCNC: 141 MMOL/L (ref 132–146)
WBC # BLD: 6.4 E9/L (ref 4.5–11.5)

## 2022-08-05 PROCEDURE — 2500000003 HC RX 250 WO HCPCS: Performed by: ANESTHESIOLOGIST ASSISTANT

## 2022-08-05 PROCEDURE — 80048 BASIC METABOLIC PNL TOTAL CA: CPT

## 2022-08-05 PROCEDURE — 6360000002 HC RX W HCPCS: Performed by: ANESTHESIOLOGIST ASSISTANT

## 2022-08-05 PROCEDURE — 3600000013 HC SURGERY LEVEL 3 ADDTL 15MIN: Performed by: OTOLARYNGOLOGY

## 2022-08-05 PROCEDURE — 7100000000 HC PACU RECOVERY - FIRST 15 MIN: Performed by: OTOLARYNGOLOGY

## 2022-08-05 PROCEDURE — 3700000000 HC ANESTHESIA ATTENDED CARE: Performed by: OTOLARYNGOLOGY

## 2022-08-05 PROCEDURE — 85025 COMPLETE CBC W/AUTO DIFF WBC: CPT

## 2022-08-05 PROCEDURE — 99284 EMERGENCY DEPT VISIT MOD MDM: CPT

## 2022-08-05 PROCEDURE — 86900 BLOOD TYPING SEROLOGIC ABO: CPT

## 2022-08-05 PROCEDURE — 7100000001 HC PACU RECOVERY - ADDTL 15 MIN: Performed by: OTOLARYNGOLOGY

## 2022-08-05 PROCEDURE — 86850 RBC ANTIBODY SCREEN: CPT

## 2022-08-05 PROCEDURE — 3700000001 HC ADD 15 MINUTES (ANESTHESIA): Performed by: OTOLARYNGOLOGY

## 2022-08-05 PROCEDURE — 3600000003 HC SURGERY LEVEL 3 BASE: Performed by: OTOLARYNGOLOGY

## 2022-08-05 PROCEDURE — 83605 ASSAY OF LACTIC ACID: CPT

## 2022-08-05 PROCEDURE — 2580000003 HC RX 258: Performed by: ANESTHESIOLOGIST ASSISTANT

## 2022-08-05 PROCEDURE — 2580000003 HC RX 258: Performed by: EMERGENCY MEDICINE

## 2022-08-05 PROCEDURE — 86901 BLOOD TYPING SEROLOGIC RH(D): CPT

## 2022-08-05 PROCEDURE — 2709999900 HC NON-CHARGEABLE SUPPLY: Performed by: OTOLARYNGOLOGY

## 2022-08-05 PROCEDURE — 36415 COLL VENOUS BLD VENIPUNCTURE: CPT

## 2022-08-05 RX ORDER — ROCURONIUM BROMIDE 10 MG/ML
INJECTION, SOLUTION INTRAVENOUS PRN
Status: DISCONTINUED | OUTPATIENT
Start: 2022-08-05 | End: 2022-08-05 | Stop reason: SDUPTHER

## 2022-08-05 RX ORDER — SODIUM CHLORIDE 9 MG/ML
INJECTION, SOLUTION INTRAVENOUS PRN
Status: DISCONTINUED | OUTPATIENT
Start: 2022-08-05 | End: 2022-08-06 | Stop reason: HOSPADM

## 2022-08-05 RX ORDER — ONDANSETRON 2 MG/ML
INJECTION INTRAMUSCULAR; INTRAVENOUS PRN
Status: DISCONTINUED | OUTPATIENT
Start: 2022-08-05 | End: 2022-08-05 | Stop reason: SDUPTHER

## 2022-08-05 RX ORDER — SODIUM CHLORIDE 9 MG/ML
INJECTION, SOLUTION INTRAVENOUS PRN
Status: CANCELLED | OUTPATIENT
Start: 2022-08-05

## 2022-08-05 RX ORDER — SODIUM CHLORIDE 0.9 % (FLUSH) 0.9 %
5-40 SYRINGE (ML) INJECTION EVERY 12 HOURS SCHEDULED
Status: CANCELLED | OUTPATIENT
Start: 2022-08-05

## 2022-08-05 RX ORDER — LABETALOL HYDROCHLORIDE 5 MG/ML
INJECTION, SOLUTION INTRAVENOUS PRN
Status: DISCONTINUED | OUTPATIENT
Start: 2022-08-05 | End: 2022-08-05 | Stop reason: SDUPTHER

## 2022-08-05 RX ORDER — LIDOCAINE HYDROCHLORIDE 20 MG/ML
INJECTION, SOLUTION INTRAVENOUS PRN
Status: DISCONTINUED | OUTPATIENT
Start: 2022-08-05 | End: 2022-08-05 | Stop reason: SDUPTHER

## 2022-08-05 RX ORDER — MIDAZOLAM HYDROCHLORIDE 1 MG/ML
INJECTION INTRAMUSCULAR; INTRAVENOUS PRN
Status: DISCONTINUED | OUTPATIENT
Start: 2022-08-05 | End: 2022-08-05 | Stop reason: SDUPTHER

## 2022-08-05 RX ORDER — 0.9 % SODIUM CHLORIDE 0.9 %
1000 INTRAVENOUS SOLUTION INTRAVENOUS ONCE
Status: COMPLETED | OUTPATIENT
Start: 2022-08-05 | End: 2022-08-05

## 2022-08-05 RX ORDER — SUCCINYLCHOLINE/SOD CL,ISO/PF 200MG/10ML
SYRINGE (ML) INTRAVENOUS PRN
Status: DISCONTINUED | OUTPATIENT
Start: 2022-08-05 | End: 2022-08-05 | Stop reason: SDUPTHER

## 2022-08-05 RX ORDER — GLYCOPYRROLATE 0.2 MG/ML
INJECTION INTRAMUSCULAR; INTRAVENOUS PRN
Status: DISCONTINUED | OUTPATIENT
Start: 2022-08-05 | End: 2022-08-05 | Stop reason: SDUPTHER

## 2022-08-05 RX ORDER — SODIUM CHLORIDE 9 MG/ML
INJECTION, SOLUTION INTRAVENOUS CONTINUOUS PRN
Status: DISCONTINUED | OUTPATIENT
Start: 2022-08-05 | End: 2022-08-05 | Stop reason: SDUPTHER

## 2022-08-05 RX ORDER — SODIUM CHLORIDE 0.9 % (FLUSH) 0.9 %
5-40 SYRINGE (ML) INJECTION PRN
Status: CANCELLED | OUTPATIENT
Start: 2022-08-05

## 2022-08-05 RX ORDER — DEXAMETHASONE SODIUM PHOSPHATE 10 MG/ML
INJECTION INTRAMUSCULAR; INTRAVENOUS PRN
Status: DISCONTINUED | OUTPATIENT
Start: 2022-08-05 | End: 2022-08-05 | Stop reason: SDUPTHER

## 2022-08-05 RX ORDER — SODIUM CHLORIDE 0.9 % (FLUSH) 0.9 %
5-40 SYRINGE (ML) INJECTION EVERY 12 HOURS SCHEDULED
Status: DISCONTINUED | OUTPATIENT
Start: 2022-08-06 | End: 2022-08-06 | Stop reason: HOSPADM

## 2022-08-05 RX ORDER — SODIUM CHLORIDE 0.9 % (FLUSH) 0.9 %
5-40 SYRINGE (ML) INJECTION PRN
Status: DISCONTINUED | OUTPATIENT
Start: 2022-08-05 | End: 2022-08-06 | Stop reason: HOSPADM

## 2022-08-05 RX ORDER — MORPHINE SULFATE 2 MG/ML
2 INJECTION, SOLUTION INTRAMUSCULAR; INTRAVENOUS EVERY 5 MIN PRN
Status: DISCONTINUED | OUTPATIENT
Start: 2022-08-05 | End: 2022-08-06 | Stop reason: HOSPADM

## 2022-08-05 RX ORDER — FENTANYL CITRATE 50 UG/ML
INJECTION, SOLUTION INTRAMUSCULAR; INTRAVENOUS PRN
Status: DISCONTINUED | OUTPATIENT
Start: 2022-08-05 | End: 2022-08-05 | Stop reason: SDUPTHER

## 2022-08-05 RX ORDER — ONDANSETRON 2 MG/ML
4 INJECTION INTRAMUSCULAR; INTRAVENOUS
Status: ACTIVE | OUTPATIENT
Start: 2022-08-05 | End: 2022-08-05

## 2022-08-05 RX ORDER — MORPHINE SULFATE 2 MG/ML
1 INJECTION, SOLUTION INTRAMUSCULAR; INTRAVENOUS EVERY 5 MIN PRN
Status: DISCONTINUED | OUTPATIENT
Start: 2022-08-05 | End: 2022-08-06 | Stop reason: HOSPADM

## 2022-08-05 RX ORDER — PROPOFOL 10 MG/ML
INJECTION, EMULSION INTRAVENOUS PRN
Status: DISCONTINUED | OUTPATIENT
Start: 2022-08-05 | End: 2022-08-05 | Stop reason: SDUPTHER

## 2022-08-05 RX ADMIN — ROCURONIUM BROMIDE 20 MG: 10 SOLUTION INTRAVENOUS at 23:11

## 2022-08-05 RX ADMIN — SUGAMMADEX 400 MG: 100 INJECTION, SOLUTION INTRAVENOUS at 23:28

## 2022-08-05 RX ADMIN — ROCURONIUM BROMIDE 10 MG: 10 SOLUTION INTRAVENOUS at 23:05

## 2022-08-05 RX ADMIN — MIDAZOLAM 2 MG: 1 INJECTION INTRAMUSCULAR; INTRAVENOUS at 23:04

## 2022-08-05 RX ADMIN — SODIUM CHLORIDE 1000 ML: 9 INJECTION, SOLUTION INTRAVENOUS at 20:48

## 2022-08-05 RX ADMIN — GLYCOPYRROLATE 0.2 MG: 0.2 INJECTION, SOLUTION INTRAMUSCULAR; INTRAVENOUS at 23:12

## 2022-08-05 RX ADMIN — PROPOFOL 200 MG: 10 INJECTION, EMULSION INTRAVENOUS at 23:05

## 2022-08-05 RX ADMIN — ONDANSETRON 4 MG: 2 INJECTION INTRAMUSCULAR; INTRAVENOUS at 23:16

## 2022-08-05 RX ADMIN — LIDOCAINE HYDROCHLORIDE 60 MG: 20 INJECTION, SOLUTION INTRAVENOUS at 23:05

## 2022-08-05 RX ADMIN — DEXAMETHASONE SODIUM PHOSPHATE 10 MG: 10 INJECTION INTRAMUSCULAR; INTRAVENOUS at 23:16

## 2022-08-05 RX ADMIN — FENTANYL CITRATE 50 MCG: 50 INJECTION, SOLUTION INTRAMUSCULAR; INTRAVENOUS at 23:05

## 2022-08-05 RX ADMIN — ROCURONIUM BROMIDE 10 MG: 10 SOLUTION INTRAVENOUS at 23:20

## 2022-08-05 RX ADMIN — SODIUM CHLORIDE: 9 INJECTION, SOLUTION INTRAVENOUS at 23:01

## 2022-08-05 RX ADMIN — LABETALOL HYDROCHLORIDE 5 MG: 5 INJECTION INTRAVENOUS at 23:22

## 2022-08-05 RX ADMIN — Medication 140 MG: at 23:05

## 2022-08-05 RX ADMIN — PROPOFOL 50 MG: 10 INJECTION, EMULSION INTRAVENOUS at 23:22

## 2022-08-05 ASSESSMENT — ENCOUNTER SYMPTOMS
STRIDOR: 0
EYE REDNESS: 0
COLOR CHANGE: 0
SINUS PAIN: 0
NAUSEA: 0
ABDOMINAL PAIN: 0
SORE THROAT: 0
SHORTNESS OF BREATH: 0
SORE THROAT: 1
COUGH: 0
ALLERGIC/IMMUNOLOGIC NEGATIVE: 1
VOMITING: 0
WHEEZING: 0
EYE DISCHARGE: 0
BACK PAIN: 0
DIARRHEA: 0
EYE PAIN: 0

## 2022-08-06 VITALS
HEIGHT: 76 IN | OXYGEN SATURATION: 100 % | TEMPERATURE: 97.8 F | HEART RATE: 90 BPM | BODY MASS INDEX: 24.84 KG/M2 | WEIGHT: 204 LBS | RESPIRATION RATE: 16 BRPM | SYSTOLIC BLOOD PRESSURE: 124 MMHG | DIASTOLIC BLOOD PRESSURE: 62 MMHG

## 2022-08-06 PROCEDURE — 2580000003 HC RX 258: Performed by: STUDENT IN AN ORGANIZED HEALTH CARE EDUCATION/TRAINING PROGRAM

## 2022-08-06 PROCEDURE — 6360000002 HC RX W HCPCS: Performed by: STUDENT IN AN ORGANIZED HEALTH CARE EDUCATION/TRAINING PROGRAM

## 2022-08-06 PROCEDURE — 96376 TX/PRO/DX INJ SAME DRUG ADON: CPT

## 2022-08-06 PROCEDURE — 96374 THER/PROPH/DIAG INJ IV PUSH: CPT

## 2022-08-06 PROCEDURE — 1200000000 HC SEMI PRIVATE

## 2022-08-06 PROCEDURE — G0378 HOSPITAL OBSERVATION PER HR: HCPCS

## 2022-08-06 RX ORDER — HYDROCODONE BITARTRATE AND ACETAMINOPHEN 5; 325 MG/1; MG/1
1 TABLET ORAL EVERY 6 HOURS PRN
Qty: 20 TABLET | Refills: 0 | Status: SHIPPED | OUTPATIENT
Start: 2022-08-06 | End: 2022-08-11

## 2022-08-06 RX ORDER — SODIUM CHLORIDE 9 MG/ML
INJECTION, SOLUTION INTRAVENOUS CONTINUOUS
Status: DISCONTINUED | OUTPATIENT
Start: 2022-08-06 | End: 2022-08-06 | Stop reason: HOSPADM

## 2022-08-06 RX ORDER — HYDROCODONE BITARTRATE AND ACETAMINOPHEN 7.5; 325 MG/1; MG/1
1 TABLET ORAL EVERY 4 HOURS PRN
Status: DISCONTINUED | OUTPATIENT
Start: 2022-08-06 | End: 2022-08-06 | Stop reason: HOSPADM

## 2022-08-06 RX ORDER — IBUPROFEN 400 MG/1
600 TABLET ORAL EVERY 8 HOURS PRN
Status: DISCONTINUED | OUTPATIENT
Start: 2022-08-06 | End: 2022-08-06 | Stop reason: HOSPADM

## 2022-08-06 RX ORDER — DEXAMETHASONE SODIUM PHOSPHATE 4 MG/ML
8 INJECTION, SOLUTION INTRA-ARTICULAR; INTRALESIONAL; INTRAMUSCULAR; INTRAVENOUS; SOFT TISSUE EVERY 8 HOURS
Status: DISCONTINUED | OUTPATIENT
Start: 2022-08-06 | End: 2022-08-06 | Stop reason: HOSPADM

## 2022-08-06 RX ORDER — SODIUM CHLORIDE 9 MG/ML
INJECTION, SOLUTION INTRAVENOUS PRN
Status: DISCONTINUED | OUTPATIENT
Start: 2022-08-06 | End: 2022-08-06 | Stop reason: HOSPADM

## 2022-08-06 RX ORDER — SODIUM CHLORIDE 0.9 % (FLUSH) 0.9 %
5-40 SYRINGE (ML) INJECTION PRN
Status: DISCONTINUED | OUTPATIENT
Start: 2022-08-06 | End: 2022-08-06 | Stop reason: HOSPADM

## 2022-08-06 RX ORDER — ONDANSETRON 2 MG/ML
4 INJECTION INTRAMUSCULAR; INTRAVENOUS EVERY 6 HOURS PRN
Status: DISCONTINUED | OUTPATIENT
Start: 2022-08-06 | End: 2022-08-06 | Stop reason: HOSPADM

## 2022-08-06 RX ORDER — SODIUM CHLORIDE 0.9 % (FLUSH) 0.9 %
5-40 SYRINGE (ML) INJECTION EVERY 12 HOURS SCHEDULED
Status: DISCONTINUED | OUTPATIENT
Start: 2022-08-06 | End: 2022-08-06 | Stop reason: HOSPADM

## 2022-08-06 RX ORDER — ONDANSETRON 4 MG/1
4 TABLET, ORALLY DISINTEGRATING ORAL EVERY 8 HOURS PRN
Status: DISCONTINUED | OUTPATIENT
Start: 2022-08-06 | End: 2022-08-06 | Stop reason: HOSPADM

## 2022-08-06 RX ADMIN — SODIUM CHLORIDE: 9 INJECTION, SOLUTION INTRAVENOUS at 01:58

## 2022-08-06 RX ADMIN — DEXAMETHASONE SODIUM PHOSPHATE 8 MG: 4 INJECTION, SOLUTION INTRAMUSCULAR; INTRAVENOUS at 09:31

## 2022-08-06 RX ADMIN — DEXAMETHASONE SODIUM PHOSPHATE 8 MG: 4 INJECTION, SOLUTION INTRAMUSCULAR; INTRAVENOUS at 01:52

## 2022-08-06 RX ADMIN — SODIUM CHLORIDE, PRESERVATIVE FREE 10 ML: 5 INJECTION INTRAVENOUS at 09:31

## 2022-08-06 ASSESSMENT — PAIN SCALES - GENERAL: PAINLEVEL_OUTOF10: 0

## 2022-08-06 ASSESSMENT — LIFESTYLE VARIABLES
HOW OFTEN DO YOU HAVE A DRINK CONTAINING ALCOHOL: MONTHLY OR LESS
HOW MANY STANDARD DRINKS CONTAINING ALCOHOL DO YOU HAVE ON A TYPICAL DAY: 1 OR 2

## 2022-08-06 NOTE — PROGRESS NOTES
Floor Called, nurse to nurse given. Spoke with Laveen . Patients test results review, VS reported to receiving nurse. Any and all important information regarding patient disclosed.

## 2022-08-06 NOTE — PLAN OF CARE
Problem: Discharge Planning  Goal: Discharge to home or other facility with appropriate resources  Outcome: Progressing  Flowsheets (Taken 8/6/2022 0127)  Discharge to home or other facility with appropriate resources:   Identify barriers to discharge with patient and caregiver   Identify discharge learning needs (meds, wound care, etc)   Refer to discharge planning if patient needs post-hospital services based on physician order or complex needs related to functional status, cognitive ability or social support system   Arrange for needed discharge resources and transportation as appropriate     Problem: ABCDS Injury Assessment  Goal: Absence of physical injury  Outcome: Progressing

## 2022-08-06 NOTE — ANESTHESIA POSTPROCEDURE EVALUATION
Department of Anesthesiology  Postprocedure Note    Patient: Kalpesh Whitman  MRN: [id number]  YOB: 2000  Date of evaluation: 8/6/2022      Procedure Summary     Date: 08/05/22 [place] [facility] [place] [facility]    Anesthesia Start: 2301 Anesthesia Stop: 2347    Procedure: TONSILLECTOMY POSTOP HEMORRHAGE CONTROL (Mouth) Diagnosis:       Bleeding      (TONSIL BLEED)    Surgeons: Michelle Jacobson MD Responsible Provider: Layne Llamas DO    Anesthesia Type: General ASA Status: 2 - Emergent          Anesthesia Type: General    Jackie Phase I: Jackie Score: 10    Jackie Phase II:        Anesthesia Post Evaluation    Patient location during evaluation: PACU  Patient participation: complete - patient participated  Level of consciousness: awake and alert  Pain score: 1  Airway patency: patent  Nausea & Vomiting: no nausea and no vomiting  Complications: no  Cardiovascular status: hemodynamically stable  Respiratory status: acceptable  Hydration status: euvolemic

## 2022-08-06 NOTE — PROGRESS NOTES
No further bleeding. Feeling okay. Post-op precautions / instructions reviewed. Home today.  Follow up with primary surgeon

## 2022-08-06 NOTE — DISCHARGE INSTRUCTIONS
1. Follow up with Dr Deon Maldonado in 14 days  [phone]    2. A prescription for pain medication has been provided. 3. DIET: Avoid potato chips, peanuts, popcorn, and citrus juice. DAY OF OPERATION: Small quantities of water frequently repeated. Also sherbet in small quantity frequently repeated, cracked ice and popsicles. GENERALLY THE SOONER THE PATIENT RETURNS TO A NORMAL DIET,THE BETTER AND QUICKER IS THE HEALING. SECOND DAY: Milk, strained cereal, jello, pudding, beef or chicken broth, tamar, pop, and popsicles. THIRD & FOURTH DAYS: Soft foods may be added gradually-mashed potatoes, soft cereals, soft boiled eggs, milk toast, etc.    4. Gargles should not be attempted unless recommended. Objectionable odors from the mouth are to be expected for several days. Coughing and hawking or clearing the throat are to be avoided as much as possible since bleeding may be started. Pain in the ears is common and usually comes from the throat. It is worse at night and before meals and in the morning. Frequent chewing of bubble gum or regular gum will help ease the pain. 5. If the patient starts bleeding from the tonsil, rinse the mouth with cold clear water. If the bleeding stops, then be watch for further bleeding. If bleeding continues, then go to the ER. ER will contact ENT if surgical intervention is needed    6. A rise of 1/2 to 1 degree in the child's temperature post-operatively is usually expected in those who do not take adequate amount of liquids, and is caused by mild dehydration rather than infection. 7. NO STRENUOUS ACTIVITY FOR 2 WEEKS AFTER SURGERY. 8.No travel from the area for 2 weeks after surgery.

## 2022-08-06 NOTE — H&P
EXAM:    Vitals:    08/05/22 2100   BP:    Pulse:    Resp:    Temp:    SpO2: 100%       General Appearance:  Laying in bed, awake, alert, no apparent distress  Head/face:  NC/AT  Eyes: PERRL, EOMI  ENT: Bilateral external ears WNL, Nares patent, Septum midline, R mid pole clot  Neck:Supple, no adenopathy  Lungs:  Non-labored, good respiratory effort, no stridor  Heart:  RR  Neuro: Facial nerve symmetric and intact. House Brackmann 1/6, bilaterally. LABS:  CBC  Recent Labs     08/05/22 2023   WBC 6.4   HGB 15.9   HCT 45.9          RADIOLOGY  No results found. ASSESSMENT:  25 y.o. male with post tonsillectomy hemorrhage     PLAN:  OR STAT for control of tonsillar hemorrhage   Type and Screen  Will admit for obs   Discussed with Dr. Radha Fox    Electronically signed by Riley Haney DO on 8/5/22 at 10:17 PM EDT  pt seen and examined. Agree with need for OR, control of post-op bleed.  Dr. Radha Fox

## 2022-08-06 NOTE — PROGRESS NOTES
Patient admitted to pacu. Patient placed on appropriate monitors. Patient alert, respirations unlabored. Mother in waiting room, updated on bed assignment.

## 2022-08-06 NOTE — ANESTHESIA PRE PROCEDURE
Department of Anesthesiology  Preprocedure Note       Name:  Nicole Mcgarry   Age:  25 y.o.  :  2000                                          MRN:  [id number]         Date:  2022      Surgeon: Yola Monroe):  Joel Steinberg MD    Procedure: Procedure(s):  TONSILLECTOMY POSTOP HEMORRHAGE CONTROL    Medications prior to admission:   Prior to Admission medications    Medication Sig Start Date End Date Taking? Authorizing Provider   fluticasone (FLONASE) 50 MCG/ACT nasal spray 2 sprays by Each Nostril route daily    Historical Provider, MD       Current medications:    No current facility-administered medications for this encounter.      Current Outpatient Medications   Medication Sig Dispense Refill    fluticasone (FLONASE) 50 MCG/ACT nasal spray 2 sprays by Each Nostril route daily         Allergies:  No Known Allergies    Problem List:    Patient Active Problem List   Diagnosis Code    Acute tonsillitis due to infectious mononucleosis J03.80, B27.90    Post-op pain G89.18       Past Medical History:        Diagnosis Date    COVID-19 2021    upper resp  body aches       Past Surgical History:        Procedure Laterality Date    TONSILLECTOMY AND ADENOIDECTOMY N/A 2022    TONSILLECTOMY performed by Giovanni Hough DO at [address] [place] [address]         Social History:    Social History     Tobacco Use    Smoking status: Never    Smokeless tobacco: Never   Substance Use Topics    Alcohol use: Yes     Comment: socially                                Counseling given: Not Answered      Vital Signs (Current):   Vitals:    22   BP: 128/75      Pulse: 81      Resp: 19      Temp: 97.8 °F (36.6 °C)      SpO2:  100% 100% 100%                                              BP Readings from Last 3 Encounters:   22 128/75   22 122/70   22 121/80       NPO Status: BMI:   Wt Readings from Last 3 Encounters:   07/28/22 223 lb 6.4 oz (101.3 kg)   07/18/22 222 lb 8 oz (100.9 kg)   11/29/21 220 lb 8 oz (100 kg)     There is no height or weight on file to calculate BMI.    CBC:   Lab Results   Component Value Date/Time    WBC 6.4 08/05/2022 08:23 PM    RBC 5.53 08/05/2022 08:23 PM    HGB 15.9 08/05/2022 08:23 PM    HCT 45.9 08/05/2022 08:23 PM    MCV 83.0 08/05/2022 08:23 PM    RDW 12.5 08/05/2022 08:23 PM     08/05/2022 08:23 PM       CMP:   Lab Results   Component Value Date/Time     08/05/2022 08:23 PM    K 3.7 08/05/2022 08:23 PM     08/05/2022 08:23 PM    CO2 24 08/05/2022 08:23 PM    BUN 11 08/05/2022 08:23 PM    CREATININE 1.3 08/05/2022 08:23 PM    GFRAA >60 08/05/2022 08:23 PM    LABGLOM >60 08/05/2022 08:23 PM    GLUCOSE 103 08/05/2022 08:23 PM    PROT 7.4 11/12/2021 05:22 AM    CALCIUM 10.0 08/05/2022 08:23 PM    BILITOT 0.4 11/12/2021 05:22 AM    ALKPHOS 68 11/12/2021 05:22 AM    AST 27 11/12/2021 05:22 AM    ALT 35 11/12/2021 05:22 AM       POC Tests: No results for input(s): POCGLU, POCNA, POCK, POCCL, POCBUN, POCHEMO, POCHCT in the last 72 hours.     Coags: No results found for: PROTIME, INR, APTT    HCG (If Applicable): No results found for: PREGTESTUR, PREGSERUM, HCG, HCGQUANT     ABGs: No results found for: PHART, PO2ART, FOL8QKY, FWE3UJR, BEART, N2OPAAJO     Type & Screen (If Applicable):  No results found for: LABABO, LABRH    Drug/Infectious Status (If Applicable):  No results found for: HIV, HEPCAB    COVID-19 Screening (If Applicable): No results found for: COVID19        Anesthesia Evaluation  Patient summary reviewed and Nursing notes reviewed no history of anesthetic complications:   Airway: Mallampati: II  TM distance: >3 FB   Neck ROM: full  Comment: Elongated uvula  Mouth opening: > = 3 FB   Dental: normal exam         Pulmonary: breath sounds clear to auscultation                            ROS comment: S/P Tonsilectomy 8 days ago. Now with a post tonsillectomy hemorrhage. Cardiovascular:Negative CV ROS            Rhythm: regular  Rate: normal                    Neuro/Psych:   Negative Neuro/Psych ROS              GI/Hepatic/Renal: Neg GI/Hepatic/Renal ROS            Endo/Other: Negative Endo/Other ROS                    Abdominal:             Vascular: negative vascular ROS. Other Findings:           Anesthesia Plan      general     ASA 2 - emergent             Anesthetic plan and risks discussed with patient and mother. Use of blood products discussed with patient and mother whom consented to blood products. Plan discussed with CRNA. Sonam Townsend DO   8/5/2022      Patient seen and examined, chart reviewed, agree with above findings. Anesthetic plan, risks, benefits, alternatives, and personnel involved discussed with patient and his mother. Patient verbalized an understanding and agreed to proceed. NPO status confirmed. Anesthetic plan discussed with care team members and agreed upon.     Sonam Townsend DO   8/5/2022  10:54 PM

## 2022-08-06 NOTE — PROGRESS NOTES
OTOLARYNGOLOGY  DAILY PROGRESS NOTE  2022    Subjective:     Doing well    Objective:     /62   Pulse 90   Temp 97.8 °F (36.6 °C) (Oral)   Resp 16   Ht 6' 4\" (1.93 m)   Wt 204 lb (92.5 kg)   SpO2 100%   BMI 24.83 kg/m²   PULSE OXIMETRY RANGE: SpO2  Av.8 %  Min: 94 %  Max: 100 %  I/O last 3 completed shifts:   In:  [I.V.:1000; IV Piggyback:1000]  Out: -     GENERAL:  Awake, alert, cooperative, no apparent distress  HENT: Normocephalic, atraumatic, no oral bleeding   EYES: No sclera icterus, pupils equal  LUNGS:  No increased work of breathing, no stridor  CARDIOVASCULAR:  RR      Assessment/Plan:     25 y.o. male post op control of tonsillar hemorrhage   Doing well no bleeding  Ok for DC  Follow up with Dr. Patrick Crum in 2 weeks       Electronically signed by Jake Cota DO on 2022 at 9:24 AM

## 2022-08-06 NOTE — OP NOTE
Operative Note      Patient: Dilma Atwood  YOB: 2000  MRN: [id number]    Date of Procedure: 8/5/2022    Pre-Op Diagnosis: TONSIL BLEED    Post-Op Diagnosis: Same       Procedure(s):  TONSILLECTOMY POSTOP HEMORRHAGE CONTROL    Surgeon(s):  Rachana Fontenot MD    Assistant:   * No surgical staff found *    Anesthesia: General    Estimated Blood Loss (mL): less than 50     Complications: None    Specimens:   * No specimens in log *    Implants:  * No implants in log *      Drains: * No LDAs found *      Detailed Description of Procedure:       Findings: Bleeding from the bilateral tonsillar fossa      Indication: 25 y.o. male presents to ER with hemorrhaging tonsil s/p tonsillectomy. Procedure: Pt was consented preoperatively. Taken to the OR and identified appropriately. Pt was then given to anesthesia for proper intubation. Once intubated the pt had a shoulder roll and head drape placed, then was prepped and draped in a sterile fashion. A [place] jay mouth gag was then place in the pt's oral cavity and opened to give exposure to the oral cavity. The instrument was then suspended from the [address] [place] [address] stand. Left fossa: The left tonsil was examined and was found to have bleeding in the upper pole. A suction cautery was used to achieve hemostasis. Right fossa: The right tonsil was examined and was found to have bleeding in the middle pole. A suction cautery was used to achieve hemostasis. Once no more bleeding was seen, the pt was irrigated with iced saline in the oral cavity. Pt was taken down from suspension off the [place] stand and allowed to sit in neutral position for one minute. The oral cavity was recheck and no bleeding was found. A [place] sump catheter was then placed down the esophagus to suction out the stomach from blood. Pt was then turned back to anesthesia for appropriate awakening. Pt tolerated procedure well. Dr. Willis Hamilton was present the entire case.       Electronically signed by Randee Najera DO on 8/5/2022 at 11:29 PM

## 2022-08-06 NOTE — ED PROVIDER NOTES
regular rhythm. Heart sounds: Normal heart sounds. No murmur heard. Pulmonary:      Effort: Pulmonary effort is normal.      Breath sounds: Normal breath sounds. Abdominal:      General: Bowel sounds are normal.      Palpations: Abdomen is soft. Tenderness: There is no abdominal tenderness. There is no guarding or rebound. Musculoskeletal:         General: No tenderness. Cervical back: Normal range of motion and neck supple. Skin:     General: Skin is warm and dry. Neurological:      General: No focal deficit present. Mental Status: He is alert and oriented to person, place, and time. Procedures       MDM  Number of Diagnoses or Management Options  Diagnosis management comments: 70-year-old male with noncontributory past medical history presenting with bleeding after his tonsillectomy that was about a week ago. On arrival to emergency room he is hemodynamically stable. Physical exam was concerning for continued postop bleed as there was partly coagulated blood hanging from the tonsillectomy site specifically on the right side. Patient gargled for symptomatic management in the emergency department, remained hemodynamically stable. The case was discussed with ENT and they came to the emergency department to evaluate the patient. Decision was made to take him to the operating room as he did have a postop bleed directly after the surgery and has this is a second one they took him to the operating room to address it. They will keep him for observation overnight. ED Course as of 08/06/22 0335   Fri Aug 05, 2022   2128 Patient reevaluated, swishing water and not rebleeding. [MM]   7537 Discussed with ENT, they will take the patient to the operating room. [MM]      ED Course User Index  [MM] Yazan Pete DO        ED Course as of 08/06/22 0335   Fri Aug 05, 2022   2128 Patient reevaluated, swishing water and not rebleeding.   [MM]   8020 Discussed with ENT, they will take the patient to the operating room. [MM]      ED Course User Index  [MM] Rafiq Segovia, DO       --------------------------------------------- PAST HISTORY ---------------------------------------------  Past Medical History:  has a past medical history of COVID-19. Past Surgical History:  has a past surgical history that includes [place] tooth extraction and Tonsillectomy and adenoidectomy (N/A, 7/28/2022). Social History:  reports that he has never smoked. He has never used smokeless tobacco. He reports current alcohol use. He reports that he does not use drugs. Family History: family history includes Arthritis in his mother. The patients home medications have been reviewed. Allergies: Patient has no known allergies.     -------------------------------------------------- RESULTS -------------------------------------------------    LABS:  Results for orders placed or performed during the hospital encounter of 08/05/22   CBC with Auto Differential   Result Value Ref Range    WBC 6.4 4.5 - 11.5 E9/L    RBC 5.53 3.80 - 5.80 E12/L    Hemoglobin 15.9 12.5 - 16.5 g/dL    Hematocrit 45.9 37.0 - 54.0 %    MCV 83.0 80.0 - 99.9 fL    MCH 28.8 26.0 - 35.0 pg    MCHC 34.6 (H) 32.0 - 34.5 %    RDW 12.5 11.5 - 15.0 fL    Platelets 972 008 - 637 E9/L    MPV 8.8 7.0 - 12.0 fL    Neutrophils % 40.0 (L) 43.0 - 80.0 %    Immature Granulocytes % 0.3 0.0 - 5.0 %    Lymphocytes % 45.2 (H) 20.0 - 42.0 %    Monocytes % 8.8 2.0 - 12.0 %    Eosinophils % 4.9 0.0 - 6.0 %    Basophils % 0.8 0.0 - 2.0 %    Neutrophils Absolute 2.56 1.80 - 7.30 E9/L    Immature Granulocytes # 0.02 E9/L    Lymphocytes Absolute 2.89 1.50 - 4.00 E9/L    Monocytes Absolute 0.56 0.10 - 0.95 E9/L    Eosinophils Absolute 0.31 0.05 - 0.50 E9/L    Basophils Absolute 0.05 0.00 - 0.20 I4/Z   Basic Metabolic Panel w/ Reflex to MG   Result Value Ref Range    Sodium 141 132 - 146 mmol/L    Potassium reflex Magnesium 3.7 3.5 - 5.0 mmol/L    Chloride 101 98 - 107 mmol/L    CO2 24 22 - 29 mmol/L    Anion Gap 16 7 - 16 mmol/L    Glucose 103 (H) 74 - 99 mg/dL    BUN 11 6 - 20 mg/dL    Creatinine 1.3 (H) 0.7 - 1.2 mg/dL    GFR Non-African American >60 >=60 mL/min/1.73    GFR African American >60     Calcium 10.0 8.6 - 10.2 mg/dL   Lactic Acid   Result Value Ref Range    Lactic Acid 1.2 0.5 - 2.2 mmol/L   TYPE AND SCREEN   Result Value Ref Range    ABO/Rh O POS     Antibody Screen NEG        RADIOLOGY:  No orders to display       ------------------------- NURSING NOTES AND VITALS REVIEWED ---------------------------  Date / Time Roomed:  8/5/2022  8:04 PM  ED Bed Assignment:  [id number]    The nursing notes within the ED encounter and vital signs as below have been reviewed. Patient Vitals for the past 24 hrs:   BP Temp Temp src Pulse Resp SpO2 Height Weight   08/06/22 0110 -- -- -- -- -- 100 % 6' 4\" (1.93 m) 204 lb (92.5 kg)   08/06/22 0045 133/69 98.2 °F (36.8 °C) Oral 81 16 100 % -- --   08/06/22 0030 122/70 97.6 °F (36.4 °C) -- 78 16 97 % -- --   08/06/22 0015 (!) 124/58 -- -- 79 16 96 % -- --   08/06/22 0000 134/67 -- -- 90 18 94 % -- --   08/05/22 2345 130/65 98.6 °F (37 °C) -- 97 18 100 % -- --   08/05/22 2100 -- -- -- -- -- 100 % -- --   08/05/22 2045 -- -- -- -- -- 100 % -- --   08/05/22 2030 -- -- -- -- -- 100 % -- --   08/05/22 2015 128/75 97.8 °F (36.6 °C) -- 81 19 -- -- --   08/2000 128/75 -- -- -- -- -- -- --   08/05/22 1959 (!) 115/94 -- -- (!) 121 18 100 % -- --       Oxygen Saturation Interpretation: Normal    ------------------------------------------ PROGRESS NOTES ------------------------------------------  Re-evaluation(s):  Time: 2200  Patients symptoms show no change  Repeat physical examination is not changed    Counseling:  I have spoken with the patient and mother and discussed todays results, in addition to providing specific details for the plan of care and counseling regarding the diagnosis and prognosis.   Their questions are answered at this time and they are agreeable with the plan of admission.    --------------------------------- ADDITIONAL PROVIDER NOTES ---------------------------------  Consultations:  Time: 2221. Spoke with Dr. Raya Major admitting for Dr. Isac Pearson. Discussed case. They will admit the patient. This patient's ED course included: a personal history and physicial examination, re-evaluation prior to disposition, multiple bedside re-evaluations, and complex medical decision making and emergency management    This patient has remained hemodynamically stable during their ED course. Diagnosis:  1. Post-operative state        Disposition:  Patient's disposition: Admit to operating room  Patient's condition is stable.            Leni Chakraborty DO  Resident  08/06/22 8851

## 2022-08-08 NOTE — DISCHARGE SUMMARY
HYDROcodone-acetaminophen 5-325 MG per tablet  Commonly known as: Norco  Take 1 tablet by mouth every 6 hours as needed for Pain for up to 5 days. Intended supply: 5 days. Take lowest dose possible to manage pain            CONTINUE taking these medications      fluticasone 50 MCG/ACT nasal spray  Commonly known as: FLONASE               Where to Get Your Medications        These medications were sent to [address] [facility], [address] [address]      Phone: [phone]   HYDROcodone-acetaminophen 5-325 MG per tablet         Patient Instructions: Activity: no lifting, or exercise for 2 weeks   Diet:  soft diet    Follow-up with Dr. Mario Interiano in 2 week(s).     Signed:  Gilda Bautista DO,   Resident Physician  Department of Otolaryngology  8/8/2022  2:35 PM

## 2022-08-12 ENCOUNTER — OFFICE VISIT (OUTPATIENT)
Dept: ENT CLINIC | Age: 22
End: 2022-08-12

## 2022-08-12 VITALS
DIASTOLIC BLOOD PRESSURE: 68 MMHG | WEIGHT: 211.6 LBS | HEART RATE: 91 BPM | HEIGHT: 76 IN | BODY MASS INDEX: 25.77 KG/M2 | SYSTOLIC BLOOD PRESSURE: 109 MMHG

## 2022-08-12 DIAGNOSIS — J03.80 ACUTE TONSILLITIS DUE TO INFECTIOUS MONONUCLEOSIS: Primary | ICD-10-CM

## 2022-08-12 DIAGNOSIS — B27.90 ACUTE TONSILLITIS DUE TO INFECTIOUS MONONUCLEOSIS: Primary | ICD-10-CM

## 2022-08-12 PROCEDURE — 99024 POSTOP FOLLOW-UP VISIT: CPT | Performed by: OTOLARYNGOLOGY

## 2022-09-08 ASSESSMENT — ENCOUNTER SYMPTOMS
SHORTNESS OF BREATH: 0
COUGH: 0
VOMITING: 0

## 2022-09-08 NOTE — PROGRESS NOTES
[facility] Otolaryngology  Dr. Oralee Comp. Arno Lesch. Ms.Ed        Patient Name:  Jamal Orosco  :  2000     CHIEF C/O:    Chief Complaint   Patient presents with    Post-Op Check     Doing ok. Hurting alittle on the left side       HISTORY OBTAINED FROM:  patient    HISTORY OF PRESENT ILLNESS:       Anup Torres is a 25y.o. year old male, here today for follow up of status post tonsillectomy, with postoperative tonsil hemorrhage that was controlled 7 days after surgery. Patient did well, did not require blood transfusion is continuing to improve has mild to moderate sore throat at this time. No complaints of fever chills or shortness of breath, no complaints of headaches or vision changes. Patient is now tolerating p.o. diet and increasing his activities as tolerated. Past Medical History:   Diagnosis Date    COVID-19 2021    upper resp  body aches     Past Surgical History:   Procedure Laterality Date    TONSILLECTOMY N/A 2022    TONSILLECTOMY POSTOP HEMORRHAGE CONTROL performed by Alfredo Baltazar MD at [place] [address] N/A 2022    TONSILLECTOMY performed by Olya Yousif DO at [address] [place] [address]         Current Outpatient Medications:     fluticasone (FLONASE) 50 MCG/ACT nasal spray, 2 sprays by Each Nostril route daily (Patient not taking: Reported on 2022), Disp: , Rfl:   Patient has no known allergies. Social History     Tobacco Use    Smoking status: Never    Smokeless tobacco: Never   Vaping Use    Vaping Use: Never used   Substance Use Topics    Alcohol use: Yes     Comment: socially    Drug use: Never     Family History   Problem Relation Age of Onset    Arthritis Mother        Review of Systems   Constitutional:  Negative for chills and fever. HENT:  Negative for ear discharge and hearing loss. Respiratory:  Negative for cough and shortness of breath.     Cardiovascular:  Negative for chest pain and palpitations. Gastrointestinal:  Negative for vomiting. Skin:  Negative for rash. Allergic/Immunologic: Negative for environmental allergies. Neurological:  Negative for dizziness and headaches. Hematological:  Does not bruise/bleed easily. All other systems reviewed and are negative. /68 (Site: Left Upper Arm, Position: Sitting, Cuff Size: Large Adult)   Pulse 91   Ht 6' 4\" (1.93 m)   Wt 211 lb 9.6 oz (96 kg)   BMI 25.76 kg/m²   Physical Exam  Vitals and nursing note reviewed. Constitutional:       Appearance: He is well-developed. HENT:      Head: Normocephalic and atraumatic. Right Ear: Tympanic membrane and ear canal normal.      Left Ear: Tympanic membrane and ear canal normal.      Nose: No congestion or rhinorrhea. Mouth/Throat:     Eyes:      Pupils: Pupils are equal, round, and reactive to light. Neck:      Thyroid: No thyromegaly. Trachea: No tracheal deviation. Cardiovascular:      Rate and Rhythm: Normal rate. Pulmonary:      Effort: Pulmonary effort is normal. No respiratory distress. Musculoskeletal:         General: Normal range of motion. Cervical back: Normal range of motion. Lymphadenopathy:      Cervical: No cervical adenopathy. Skin:     General: Skin is warm. Findings: No erythema. Neurological:      Mental Status: He is alert. Cranial Nerves: No cranial nerve deficit. IMPRESSION/PLAN:  Patient seen and examined for history of tonsil ectomy, with postoperative tonsil hemorrhage days 7. Patient was appropriately managed and did not require any blood transfusion. No other complaints today of sore throat fever chills shortness of breath stridor headaches or vision changes. Dr. Thelma Larsen.  Otolaryngology Facial Plastic Surgery  :  [address] [place] [address] Otolaryngology/Facial Plastic Surgery Residency  Associate Clinical Professor:  Rebecca Aguirre, [facility]

## (undated) DEVICE — SPONGE,TONSIL,DBL STRNG,XRAY,MED,1",STRL: Brand: MEDLINE INDUSTRIES, INC.

## (undated) DEVICE — TOWEL OR BLUEE 16X26IN ST 8 PACK ORB08 16X26ORTWL

## (undated) DEVICE — DUAL LUMEN STOMACH TUBE: Brand: SALEM SUMP

## (undated) DEVICE — STERILE POLYISOPRENE POWDER-FREE SURGICAL GLOVES WITH EMOLLIENT COATING: Brand: PROTEXIS

## (undated) DEVICE — GLOVE ORANGE PI 7 1/2   MSG9075

## (undated) DEVICE — EVAC 70 XTRA WAND: Brand: COBLATION

## (undated) DEVICE — GAUZE,SPONGE,4"X4",12PLY,STERILE,LF,2'S: Brand: MEDLINE

## (undated) DEVICE — SOLUTION IV IRRIG POUR BRL 0.9% SODIUM CHL 2F7124

## (undated) DEVICE — E-Z CLEAN, NON-STICK, PTFE COATED, ELECTROSURGICAL BLADE ELECTRODE, MODIFIED EXTENDED INSULATION, 2.5 INCH (6.35 CM): Brand: MEGADYNE

## (undated) DEVICE — VINYL URETHRAL CATHETER: Brand: DOVER

## (undated) DEVICE — CATHETER,RED SUCT,POLY-CATH,10: Brand: MEDLINE INDUSTRIES, INC.

## (undated) DEVICE — KIT,ANTI FOG,W/SPONGE & FLUID,SOFT PACK: Brand: MEDLINE

## (undated) DEVICE — HEAD AND NECK PACK: Brand: CONVERTORS

## (undated) DEVICE — SYRINGE,EAR/ULCER, 3 OZ, STERILE: Brand: MEDLINE

## (undated) DEVICE — DEVICE TNSLCTMY OPN SEAL DIV BIZACT

## (undated) DEVICE — SET SURG BASIN MAYO REUSABLE

## (undated) DEVICE — BASIC SINGLE BASIN 1-LF: Brand: MEDLINE INDUSTRIES, INC.

## (undated) DEVICE — PENCIL ES L3M BTTN SWCH HOLSTER W/ BLDE ELECTRD EDGE

## (undated) DEVICE — SUCTION COAGULATOR, FOOTSWITCHING, 10 FR, 6 INCH (15.24CM): Brand: MEGADYNE

## (undated) DEVICE — ROLL DENT MED 6X3/8 IN BRAID COTTON NS

## (undated) DEVICE — MEDI-VAC NON-CONDUCTIVE SUCTION TUBING: Brand: CARDINAL HEALTH